# Patient Record
Sex: FEMALE | Race: WHITE | NOT HISPANIC OR LATINO | Employment: FULL TIME | URBAN - METROPOLITAN AREA
[De-identification: names, ages, dates, MRNs, and addresses within clinical notes are randomized per-mention and may not be internally consistent; named-entity substitution may affect disease eponyms.]

---

## 2018-05-05 ENCOUNTER — TELEPHONE (OUTPATIENT)
Dept: FAMILY MEDICINE | Facility: CLINIC | Age: 33
End: 2018-05-05

## 2018-05-05 RX ORDER — POLYMYXIN B SULFATE AND TRIMETHOPRIM 1; 10000 MG/ML; [USP'U]/ML
1 SOLUTION OPHTHALMIC
Qty: 10 ML | Refills: 0 | Status: SHIPPED | OUTPATIENT
Start: 2018-05-05 | End: 2019-03-11 | Stop reason: ALTCHOICE

## 2018-05-05 NOTE — TELEPHONE ENCOUNTER
"ON CALL\" pt started this am with L pink eye- discharge crusty and itchy. Does have sick baby at home. Not a contact lens wearer will call in polytrim eye drops for her to pharm and warm compresses and if not better will need OV  "

## 2018-08-13 RX ORDER — OMEPRAZOLE 20 MG/1
20 CAPSULE, DELAYED RELEASE ORAL
COMMUNITY
Start: 2015-05-26 | End: 2019-03-11 | Stop reason: ALTCHOICE

## 2018-08-14 ENCOUNTER — OFFICE VISIT (OUTPATIENT)
Dept: INTERNAL MEDICINE | Facility: CLINIC | Age: 33
End: 2018-08-14
Payer: COMMERCIAL

## 2018-08-14 VITALS
HEART RATE: 93 BPM | TEMPERATURE: 98.3 F | WEIGHT: 135 LBS | BODY MASS INDEX: 22.49 KG/M2 | OXYGEN SATURATION: 99 % | HEIGHT: 65 IN | RESPIRATION RATE: 16 BRPM | SYSTOLIC BLOOD PRESSURE: 116 MMHG | DIASTOLIC BLOOD PRESSURE: 70 MMHG

## 2018-08-14 DIAGNOSIS — K21.9 GASTROESOPHAGEAL REFLUX DISEASE, ESOPHAGITIS PRESENCE NOT SPECIFIED: ICD-10-CM

## 2018-08-14 DIAGNOSIS — Z00.00 ENCOUNTER FOR GENERAL ADULT MEDICAL EXAMINATION WITHOUT ABNORMAL FINDINGS: Primary | ICD-10-CM

## 2018-08-14 DIAGNOSIS — Z11.1 ENCOUNTER FOR PPD TEST: ICD-10-CM

## 2018-08-14 PROCEDURE — 99395 PREV VISIT EST AGE 18-39: CPT | Performed by: INTERNAL MEDICINE

## 2018-08-14 PROCEDURE — 86580 TB INTRADERMAL TEST: CPT | Performed by: INTERNAL MEDICINE

## 2018-08-14 ASSESSMENT — ENCOUNTER SYMPTOMS
COLOR CHANGE: 0
NEUROLOGICAL NEGATIVE: 1
MYALGIAS: 0
LIGHT-HEADEDNESS: 0
APPETITE CHANGE: 0
SLEEP DISTURBANCE: 0
CARDIOVASCULAR NEGATIVE: 1
TROUBLE SWALLOWING: 0
HEADACHES: 0
ENDOCRINE NEGATIVE: 1
EYE PAIN: 0
NECK PAIN: 0
VOICE CHANGE: 0
HEMATOLOGIC/LYMPHATIC NEGATIVE: 1
DIARRHEA: 0
WHEEZING: 0
BLOOD IN STOOL: 0
GASTROINTESTINAL NEGATIVE: 1
NAUSEA: 0
FATIGUE: 0
PALPITATIONS: 0
MUSCULOSKELETAL NEGATIVE: 1
CHEST TIGHTNESS: 0
NERVOUS/ANXIOUS: 0
DYSURIA: 0
EYE DISCHARGE: 0
WEAKNESS: 0
DECREASED CONCENTRATION: 0
ABDOMINAL PAIN: 0
DIZZINESS: 0
EYES NEGATIVE: 1
DYSPHORIC MOOD: 0
RESPIRATORY NEGATIVE: 1
NUMBNESS: 0
CHOKING: 0
PSYCHIATRIC NEGATIVE: 1
CONSTITUTIONAL NEGATIVE: 1
CONSTIPATION: 0
BACK PAIN: 0
ABDOMINAL DISTENTION: 0
ALLERGIC/IMMUNOLOGIC NEGATIVE: 1
ACTIVITY CHANGE: 0
UNEXPECTED WEIGHT CHANGE: 0
JOINT SWELLING: 0
SHORTNESS OF BREATH: 0
APNEA: 0
FREQUENCY: 0
CONFUSION: 0
COUGH: 0
FLANK PAIN: 0
HEMATURIA: 0
ARTHRALGIAS: 0
DIFFICULTY URINATING: 0

## 2018-08-14 NOTE — PROGRESS NOTES
Subjective     Patient ID: Nicole Zawada Care is a 32 y.o. female.    Doing well. No complaints. Diet stable. Exercise approx 2-3 x/week. Baby now almost one year old-went through IVF.  forms to be completed- returns to work already this week.              Review of Systems   Constitutional: Negative.  Negative for activity change, appetite change, fatigue and unexpected weight change.   HENT: Negative.  Negative for dental problem, ear pain, hearing loss, tinnitus, trouble swallowing and voice change.    Eyes: Negative.  Negative for pain, discharge and visual disturbance.   Respiratory: Negative.  Negative for apnea, cough, choking, chest tightness, shortness of breath and wheezing.    Cardiovascular: Negative.  Negative for chest pain, palpitations and leg swelling.   Gastrointestinal: Negative.  Negative for abdominal distention, abdominal pain, blood in stool, constipation, diarrhea and nausea.   Endocrine: Negative.  Negative for cold intolerance and heat intolerance.   Genitourinary: Negative.  Negative for difficulty urinating, dysuria, flank pain, frequency, hematuria, menstrual problem and urgency.   Musculoskeletal: Negative.  Negative for arthralgias, back pain, gait problem, joint swelling, myalgias and neck pain.   Skin: Negative.  Negative for color change, pallor and rash.   Allergic/Immunologic: Negative.  Negative for environmental allergies.   Neurological: Negative.  Negative for dizziness, weakness, light-headedness, numbness and headaches.   Hematological: Negative.    Psychiatric/Behavioral: Negative.  Negative for confusion, decreased concentration, dysphoric mood and sleep disturbance. The patient is not nervous/anxious.        Current Outpatient Prescriptions   Medication Sig Dispense Refill   • omeprazole (PriLOSEC) 20 mg capsule 20 mg.       No current facility-administered medications for this visit.      History reviewed. No pertinent past medical history.  History  "reviewed. No pertinent family history.  History reviewed. No pertinent surgical history.  Social History     Social History   • Marital status:      Spouse name: N/A   • Number of children: N/A   • Years of education: N/A     Occupational History   • Not on file.     Social History Main Topics   • Smoking status: Never Smoker   • Smokeless tobacco: Never Used   • Alcohol use Yes      Comment: social   • Drug use: No   • Sexual activity: Defer     Other Topics Concern   • Not on file     Social History Narrative   • No narrative on file     Allergies   Allergen Reactions   • No Known Allergies        Objective     Vitals:    08/14/18 1542   BP: 116/70   BP Location: Right upper arm   Patient Position: Sitting   Pulse: 93   Resp: 16   Temp: 36.8 °C (98.3 °F)   TempSrc: Oral   SpO2: 99%   Weight: 61.2 kg (135 lb)   Height: 1.638 m (5' 4.5\")     Body mass index is 22.81 kg/m².    Physical Exam   Constitutional: She is oriented to person, place, and time. She appears well-developed and well-nourished.   HENT:   Head: Normocephalic and atraumatic.   Right Ear: External ear normal.   Left Ear: External ear normal.   Nose: Nose normal.   Mouth/Throat: Oropharynx is clear and moist. No oropharyngeal exudate.   Eyes: Conjunctivae and EOM are normal. Pupils are equal, round, and reactive to light. Right eye exhibits no discharge. Left eye exhibits no discharge. No scleral icterus.   Neck: Normal range of motion. Neck supple. No JVD present. No tracheal deviation present. No thyromegaly present.   Cardiovascular: Normal rate, regular rhythm, normal heart sounds and intact distal pulses.  Exam reveals no gallop and no friction rub.    No murmur heard.  Pulmonary/Chest: Effort normal and breath sounds normal. No stridor. No respiratory distress. She has no wheezes. She has no rales. She exhibits no tenderness.   Abdominal: Soft. Bowel sounds are normal. She exhibits no distension and no mass. There is no tenderness. There " is no rebound and no guarding. No hernia.   Musculoskeletal: Normal range of motion. She exhibits no edema, tenderness or deformity.   Lymphadenopathy:     She has no cervical adenopathy.   Neurological: She is alert and oriented to person, place, and time. She displays normal reflexes. No cranial nerve deficit or sensory deficit. She exhibits normal muscle tone. Coordination normal.   Skin: Skin is warm. Capillary refill takes less than 2 seconds. No rash noted. No erythema. No pallor.   Psychiatric: She has a normal mood and affect. Her behavior is normal. Judgment and thought content normal.   Nursing note and vitals reviewed.      Assessment/Plan   Problem List Items Addressed This Visit        Other    Encounter for PPD test     Place today rto in 48 hrs to be read         Relevant Orders    POCT TB Skin Test    Encounter for general adult medical examination without abnormal findings - Primary     utd HM  vax UTD  Labs ordered.   Annual exam rec.   Gyn annually             Relevant Orders    CBC and differential    Comprehensive metabolic panel    Hemoglobin A1c    Lipid panel    Urinalysis with Reflex Culture    TSH w reflex FT4      Other Visit Diagnoses     Gastroesophageal reflux disease, esophagitis presence not specified        Relevant Medications    omeprazole (PriLOSEC) 20 mg capsule        Orders Placed This Encounter   Procedures   • CBC and differential     Standing Status:   Future     Number of Occurrences:   1     Standing Expiration Date:   8/14/2019   • Comprehensive metabolic panel     Standing Status:   Future     Number of Occurrences:   1     Standing Expiration Date:   8/14/2019   • Hemoglobin A1c     Standing Status:   Future     Number of Occurrences:   1     Standing Expiration Date:   8/14/2019   • Lipid panel     Standing Status:   Future     Number of Occurrences:   1     Standing Expiration Date:   8/14/2019   • Urinalysis with Reflex Culture     Standing Status:   Future      Number of Occurrences:   1     Standing Expiration Date:   8/14/2019   • TSH w reflex FT4     Standing Status:   Future     Number of Occurrences:   1     Standing Expiration Date:   8/14/2019   • POCT TB Skin Test

## 2018-08-16 LAB
INDURATION: 0 MM
TB SKIN TEST: NEGATIVE

## 2019-03-11 ENCOUNTER — OFFICE VISIT (OUTPATIENT)
Dept: INTERNAL MEDICINE | Facility: CLINIC | Age: 34
End: 2019-03-11
Payer: COMMERCIAL

## 2019-03-11 VITALS
WEIGHT: 134.6 LBS | OXYGEN SATURATION: 98 % | HEART RATE: 70 BPM | HEIGHT: 65 IN | DIASTOLIC BLOOD PRESSURE: 72 MMHG | SYSTOLIC BLOOD PRESSURE: 116 MMHG | RESPIRATION RATE: 14 BRPM | TEMPERATURE: 98.2 F | BODY MASS INDEX: 22.42 KG/M2

## 2019-03-11 DIAGNOSIS — J02.9 ACUTE PHARYNGITIS, UNSPECIFIED ETIOLOGY: Primary | ICD-10-CM

## 2019-03-11 LAB — S PYO AG THROAT QL: NEGATIVE

## 2019-03-11 PROCEDURE — 87880 STREP A ASSAY W/OPTIC: CPT | Performed by: NURSE PRACTITIONER

## 2019-03-11 PROCEDURE — 99213 OFFICE O/P EST LOW 20 MIN: CPT | Performed by: NURSE PRACTITIONER

## 2019-03-11 ASSESSMENT — ENCOUNTER SYMPTOMS
WHEEZING: 0
DIZZINESS: 0
DIARRHEA: 0
ABDOMINAL PAIN: 0
SHORTNESS OF BREATH: 0
COUGH: 1
FATIGUE: 1
EYE ITCHING: 0
EYE DISCHARGE: 0
SORE THROAT: 1
RHINORRHEA: 1

## 2019-03-11 NOTE — ASSESSMENT & PLAN NOTE
Rapid strep negative.  Advised continue with saline gargles and increase fluids.  Okay to take Tylenol or Advil as directed for pain and fever reducer.  Advised no better or worse in 2-3 days, they are to let us know.  Otherwise return as needed/scheduled.

## 2019-03-11 NOTE — PROGRESS NOTES
Daily Progress Note      Subjective      Patient ID: Nicole Zawada Care is a 33 y.o. female presents   Chief Complaint   Patient presents with   • Sore Throat     was treated last month for strep throat   .    Sore Throat    This is a recurrent problem. The current episode started 1 to 4 weeks ago. The problem has been waxing and waning. There has been no fever. The pain is at a severity of 4/10. The pain is mild. Associated symptoms include congestion, coughing and ear pain. Pertinent negatives include no abdominal pain, diarrhea, ear discharge or shortness of breath. She has had exposure to strep. She has had no exposure to mono. She has tried nothing for the symptoms. The treatment provided mild relief.   Seen in Urgent a few weeks and tested pos for strep. Treated with Amoxil. And changed tooth brush, but now again with sore throat    The following have been reviewed and updated as appropriate in this visit:  Tobacco  Allergies  Meds  Problems  Med Hx  Surg Hx  Fam Hx  Soc Hx        Review of Systems   Constitutional: Positive for fatigue.   HENT: Positive for congestion, ear pain, postnasal drip, rhinorrhea and sore throat. Negative for ear discharge.    Eyes: Negative for discharge and itching.   Respiratory: Positive for cough. Negative for shortness of breath and wheezing.    Cardiovascular: Negative for chest pain.   Gastrointestinal: Negative for abdominal pain and diarrhea.   Skin: Negative for rash.   Neurological: Negative for dizziness.           History reviewed. No pertinent past medical history.  History reviewed. No pertinent surgical history.  Social History     Social History   • Marital status:      Spouse name: N/A   • Number of children: N/A   • Years of education: N/A     Occupational History   • Not on file.     Social History Main Topics   • Smoking status: Never Smoker   • Smokeless tobacco: Never Used   • Alcohol use Yes      Comment: social   • Drug use: No   • Sexual  "activity: Defer     Other Topics Concern   • Not on file     Social History Narrative    Teacher     Allergies   Allergen Reactions   • No Known Allergies      Current Outpatient Prescriptions   Medication Sig Dispense Refill   • ranitidine (ZANTAC) 150 mg tablet Take 150 mg by mouth daily.       No current facility-administered medications for this visit.          Objective     /72 (BP Location: Left upper arm, Patient Position: Sitting)   Pulse 70   Temp 36.8 °C (98.2 °F) (Oral)   Resp 14   Ht 1.638 m (5' 4.5\")   Wt 61.1 kg (134 lb 9.6 oz)   LMP 02/13/2019   SpO2 98%   BMI 22.75 kg/m²       Physical Exam   Constitutional: She is oriented to person, place, and time. She appears well-developed and well-nourished.   HENT:   Head: Normocephalic. Head is without right periorbital erythema and without left periorbital erythema.   Right Ear: Tympanic membrane is not injected.   Left Ear: Tympanic membrane is not injected.   Nose: Right sinus exhibits no maxillary sinus tenderness and no frontal sinus tenderness. Left sinus exhibits no maxillary sinus tenderness and no frontal sinus tenderness.   Mouth/Throat: Posterior oropharyngeal erythema present. No oropharyngeal exudate or posterior oropharyngeal edema.   Eyes: Conjunctivae are normal. Right eye exhibits no discharge. Left eye exhibits no discharge.   Neck: Normal range of motion.   Cardiovascular: Normal rate and regular rhythm.    Pulmonary/Chest: Effort normal and breath sounds normal.   Abdominal: Soft. Bowel sounds are normal.   Lymphadenopathy:     She has no cervical adenopathy.   Neurological: She is alert and oriented to person, place, and time.   Skin: Skin is warm and dry. Capillary refill takes less than 2 seconds.   Psychiatric: She has a normal mood and affect.       Assessment/Plan   Problem List Items Addressed This Visit        Other    Acute pharyngitis - Primary     Rapid strep negative.  Advised continue with saline gargles and " increase fluids.  Okay to take Tylenol or Advil as directed for pain and fever reducer.  Advised no better or worse in 2-3 days, they are to let us know.  Otherwise return as needed/scheduled.         Relevant Orders    POCT rapid strep A              KINGSLEY Mcbride  3/11/2019

## 2020-03-26 ENCOUNTER — TELEPHONE (OUTPATIENT)
Dept: PRIMARY CARE | Facility: CLINIC | Age: 35
End: 2020-03-26

## 2020-06-22 ENCOUNTER — OFFICE VISIT (OUTPATIENT)
Dept: PRIMARY CARE | Facility: CLINIC | Age: 35
End: 2020-06-22
Payer: COMMERCIAL

## 2020-06-22 VITALS
SYSTOLIC BLOOD PRESSURE: 120 MMHG | HEART RATE: 83 BPM | TEMPERATURE: 98.4 F | HEIGHT: 64 IN | BODY MASS INDEX: 22.98 KG/M2 | RESPIRATION RATE: 14 BRPM | WEIGHT: 134.6 LBS | DIASTOLIC BLOOD PRESSURE: 74 MMHG | OXYGEN SATURATION: 99 %

## 2020-06-22 DIAGNOSIS — Z00.00 ROUTINE MEDICAL EXAM: Primary | ICD-10-CM

## 2020-06-22 DIAGNOSIS — K21.9 GASTROESOPHAGEAL REFLUX DISEASE WITHOUT ESOPHAGITIS: ICD-10-CM

## 2020-06-22 PROBLEM — Z11.1 ENCOUNTER FOR PPD TEST: Status: RESOLVED | Noted: 2018-08-14 | Resolved: 2020-06-22

## 2020-06-22 PROBLEM — J02.9 ACUTE PHARYNGITIS: Status: RESOLVED | Noted: 2019-03-11 | Resolved: 2020-06-22

## 2020-06-22 PROCEDURE — 99395 PREV VISIT EST AGE 18-39: CPT | Performed by: NURSE PRACTITIONER

## 2020-06-22 RX ORDER — FAMOTIDINE 40 MG/1
40 TABLET, FILM COATED ORAL DAILY
COMMUNITY
End: 2021-07-02 | Stop reason: ALTCHOICE

## 2020-06-22 ASSESSMENT — ENCOUNTER SYMPTOMS
DIZZINESS: 0
PALPITATIONS: 0
NECK PAIN: 0
VOMITING: 0
NECK STIFFNESS: 0
BACK PAIN: 0
SHORTNESS OF BREATH: 0
LIGHT-HEADEDNESS: 0
DIARRHEA: 0
FEVER: 0
ADENOPATHY: 0
COUGH: 0
BRUISES/BLEEDS EASILY: 0
ARTHRALGIAS: 0
CHILLS: 0
ABDOMINAL PAIN: 0
HEADACHES: 0
DYSURIA: 0
NAUSEA: 0
FATIGUE: 0
TROUBLE SWALLOWING: 0

## 2020-06-22 NOTE — PATIENT INSTRUCTIONS
Continue with healthy diet and exercise.    Up  To date with dental and eye exams.  Up to date with gyn.   Up to date with immunizations: Flu, Tdap.   Complete routine screening labs.  Follow up in 1 year.

## 2020-06-22 NOTE — PROGRESS NOTES
Subjective      Patient ID: Nicole Zawada Care is a 34 y.o. female.  1985      Patient presents for a physical exam.   Diet overall is well-balanced and is starting to get back on exercise routine.   with 1 daughter age 3 (Tesha).  Menses is regular, sees [facility]. Dental and eye exam up to date. Sleeps well overall.  She is a  at [employer].   Offers no complaints .    GERD: sees [facility] Dr. Khan, she has had EGD in the past       The following have been reviewed and updated as appropriate in this visit:  Tobacco  Allergies  Meds  Med Hx  Surg Hx  Fam Hx  Soc Hx      Review of Systems   Constitutional: Negative for chills, fatigue and fever.   HENT: Negative for trouble swallowing.    Respiratory: Negative for cough and shortness of breath.    Cardiovascular: Negative for chest pain, palpitations and leg swelling.   Gastrointestinal: Negative for abdominal pain, diarrhea, nausea and vomiting.   Genitourinary: Negative for dysuria and urgency.   Musculoskeletal: Negative for arthralgias, back pain, neck pain and neck stiffness.   Skin: Negative for rash.        Sees Dr. Ember moreau    Neurological: Negative for dizziness, light-headedness and headaches.   Hematological: Negative for adenopathy. Does not bruise/bleed easily.     Patient Active Problem List   Diagnosis   • Gastroesophageal reflux disease without esophagitis      History reviewed. No pertinent past medical history.  History reviewed. No pertinent surgical history.  Social History     Socioeconomic History   • Marital status:      Spouse name: None   • Number of children: None   • Years of education: None   • Highest education level: None   Occupational History   • None   Social Needs   • Financial resource strain: None   • Food insecurity:     Worry: None     Inability: None   • Transportation needs:     Medical: None     Non-medical: None   Tobacco Use   • Smoking status: Never Smoker   • Smokeless tobacco:  "Never Used   Substance and Sexual Activity   • Alcohol use: Yes     Comment: social   • Drug use: No   • Sexual activity: Yes     Partners: Male     Birth control/protection: None   Lifestyle   • Physical activity:     Days per week: None     Minutes per session: None   • Stress: None   Relationships   • Social connections:     Talks on phone: None     Gets together: None     Attends Taoist service: None     Active member of club or organization: None     Attends meetings of clubs or organizations: None     Relationship status: None   • Intimate partner violence:     Fear of current or ex partner: None     Emotionally abused: None     Physically abused: None     Forced sexual activity: None   Other Topics Concern   • None   Social History Narrative    Teacher     Objective     Vitals:    06/22/20 0800 06/22/20 0819   BP: 124/88 120/74   BP Location: Left upper arm    Patient Position: Sitting    Pulse: 83    Resp: 14    Temp: 36.9 °C (98.4 °F)    TempSrc: Oral    SpO2: 99%    Weight: 61.1 kg (134 lb 9.6 oz)    Height: 1.626 m (5' 4\")      Body mass index is 23.1 kg/m².  Current Outpatient Medications   Medication Sig Dispense Refill   • famotidine (PEPCID) 40 mg tablet Take 40 mg by mouth daily.       No current facility-administered medications for this visit.        Physical Exam   Constitutional: She is oriented to person, place, and time. She appears well-developed and well-nourished.   HENT:   Head: Normocephalic and atraumatic.   Right Ear: External ear normal.   Left Ear: External ear normal.   Nose: Nose normal.   Mouth/Throat: Oropharynx is clear and moist.   Eyes: Conjunctivae and EOM are normal.   Neck: Normal range of motion. Neck supple. No thyromegaly present.   Cardiovascular: Normal rate, regular rhythm, normal heart sounds and intact distal pulses.   Pulmonary/Chest: Effort normal and breath sounds normal.   Abdominal: Soft. Bowel sounds are normal. There is no tenderness.   Musculoskeletal: " Normal range of motion.   Lymphadenopathy:     She has no cervical adenopathy.   Neurological: She is alert and oriented to person, place, and time.   Skin: Skin is warm and dry.   Psychiatric: She has a normal mood and affect. Her behavior is normal. Judgment and thought content normal.       Assessment/Plan     Problem List Items Addressed This Visit        Digestive    Gastroesophageal reflux disease without esophagitis    Current Assessment & Plan     Stable on pepcid, managed by dr. Khan of [facility].         Relevant Medications    famotidine (PEPCID) 40 mg tablet      Other Visit Diagnoses     Routine medical exam    -  Primary    [place] labs ordered. Up to date with dental, eye, pap and immunizations.  Continue to work on healthy diet and exercise.    Relevant Orders    Lipid panel    Comprehensive metabolic panel    CBC    TSH w reflex FT4

## 2020-06-23 ENCOUNTER — TELEPHONE (OUTPATIENT)
Dept: PRIMARY CARE | Facility: CLINIC | Age: 35
End: 2020-06-23

## 2020-06-23 NOTE — TELEPHONE ENCOUNTER
----- Message from KINGSLEY Sapp sent at 6/22/2020  8:45 AM EDT -----  Please request consult form from [facility]. Bill PATINO and KRYSTAL

## 2020-06-25 ENCOUNTER — TELEPHONE (OUTPATIENT)
Dept: PRIMARY CARE | Facility: CLINIC | Age: 35
End: 2020-06-25

## 2020-06-25 LAB
ALBUMIN SERPL-MCNC: 4.8 G/DL (ref 3.8–4.8)
ALBUMIN/GLOB SERPL: 2.1 {RATIO} (ref 1.2–2.2)
ALP SERPL-CCNC: 47 IU/L (ref 39–117)
ALT SERPL-CCNC: 14 IU/L (ref 0–32)
AST SERPL-CCNC: 22 IU/L (ref 0–40)
BILIRUB SERPL-MCNC: 0.4 MG/DL (ref 0–1.2)
BUN SERPL-MCNC: 13 MG/DL (ref 6–20)
BUN/CREAT SERPL: 18 (ref 9–23)
CALCIUM SERPL-MCNC: 9.7 MG/DL (ref 8.7–10.2)
CHLORIDE SERPL-SCNC: 98 MMOL/L (ref 96–106)
CHOLEST SERPL-MCNC: 248 MG/DL (ref 100–199)
CO2 SERPL-SCNC: 24 MMOL/L (ref 20–29)
CREAT SERPL-MCNC: 0.73 MG/DL (ref 0.57–1)
ERYTHROCYTE [DISTWIDTH] IN BLOOD BY AUTOMATED COUNT: 11.8 % (ref 11.7–15.4)
GLOBULIN SER CALC-MCNC: 2.3 G/DL (ref 1.5–4.5)
GLUCOSE SERPL-MCNC: 74 MG/DL (ref 65–99)
HCT VFR BLD AUTO: 40 % (ref 34–46.6)
HDLC SERPL-MCNC: 84 MG/DL
HGB BLD-MCNC: 13.4 G/DL (ref 11.1–15.9)
LAB CORP EGFR IF AFRICN AM: 125 ML/MIN/1.73
LAB CORP EGFR IF NONAFRICN AM: 109 ML/MIN/1.73
LDLC SERPL CALC-MCNC: 148 MG/DL (ref 0–99)
MCH RBC QN AUTO: 30.4 PG (ref 26.6–33)
MCHC RBC AUTO-ENTMCNC: 33.5 G/DL (ref 31.5–35.7)
MCV RBC AUTO: 91 FL (ref 79–97)
PLATELET # BLD AUTO: 297 X10E3/UL (ref 150–450)
POTASSIUM SERPL-SCNC: 4.2 MMOL/L (ref 3.5–5.2)
PROT SERPL-MCNC: 7.1 G/DL (ref 6–8.5)
RBC # BLD AUTO: 4.41 X10E6/UL (ref 3.77–5.28)
SODIUM SERPL-SCNC: 139 MMOL/L (ref 134–144)
T4 FREE SERPL-MCNC: 1.44 NG/DL (ref 0.82–1.77)
TRIGL SERPL-MCNC: 81 MG/DL (ref 0–149)
TSH SERPL DL<=0.005 MIU/L-ACNC: 1.34 UIU/ML (ref 0.45–4.5)
VLDLC SERPL CALC-MCNC: 16 MG/DL (ref 5–40)
WBC # BLD AUTO: 5.3 X10E3/UL (ref 3.4–10.8)

## 2020-06-25 NOTE — TELEPHONE ENCOUNTER
----- Message from KINGSLEY Sapp sent at 6/22/2020  8:49 AM EDT -----  Please request pap from [facility]

## 2021-03-24 ENCOUNTER — TELEPHONE (OUTPATIENT)
Dept: PRIMARY CARE | Facility: CLINIC | Age: 36
End: 2021-03-24

## 2021-03-24 NOTE — TELEPHONE ENCOUNTER
Patient needs to R/S her EPP that she has in June because she will be out of town. Next opening is August for Dr Cortes  She is wanting to start with Fertility  In August and wanted  To get her EPP prior to.  would like to see If Dr MOON could see her sometime in July

## 2021-03-25 NOTE — TELEPHONE ENCOUNTER
LVM offering PE with our NPs, asked patient to call office back if she would like to reschedule her appointment.

## 2021-03-26 DIAGNOSIS — Z23 ENCOUNTER FOR IMMUNIZATION: ICD-10-CM

## 2021-07-02 ENCOUNTER — OFFICE VISIT (OUTPATIENT)
Dept: PRIMARY CARE | Facility: CLINIC | Age: 36
End: 2021-07-02
Payer: COMMERCIAL

## 2021-07-02 VITALS
DIASTOLIC BLOOD PRESSURE: 82 MMHG | HEIGHT: 64 IN | WEIGHT: 133.8 LBS | RESPIRATION RATE: 16 BRPM | SYSTOLIC BLOOD PRESSURE: 126 MMHG | BODY MASS INDEX: 22.84 KG/M2 | HEART RATE: 105 BPM | TEMPERATURE: 97.6 F | OXYGEN SATURATION: 98 %

## 2021-07-02 DIAGNOSIS — F41.9 ANXIETY: Primary | ICD-10-CM

## 2021-07-02 DIAGNOSIS — F39 MOOD DISORDER (CMS/HCC): ICD-10-CM

## 2021-07-02 PROCEDURE — 99213 OFFICE O/P EST LOW 20 MIN: CPT | Performed by: NURSE PRACTITIONER

## 2021-07-02 PROCEDURE — 3008F BODY MASS INDEX DOCD: CPT | Performed by: NURSE PRACTITIONER

## 2021-07-02 RX ORDER — OMEPRAZOLE 40 MG/1
CAPSULE, DELAYED RELEASE ORAL
Status: ON HOLD | COMMUNITY
Start: 2021-05-17 | End: 2023-12-30

## 2021-07-02 RX ORDER — HYDROXYZINE PAMOATE 50 MG/1
50 CAPSULE ORAL 3 TIMES DAILY PRN
Qty: 30 CAPSULE | Refills: 0 | Status: SHIPPED | OUTPATIENT
Start: 2021-07-02 | End: 2022-10-27 | Stop reason: ALTCHOICE

## 2021-07-02 RX ORDER — ESTRADIOL VALERATE 20 MG/ML
INJECTION INTRAMUSCULAR
COMMUNITY
Start: 2021-06-17 | End: 2021-07-14 | Stop reason: ALTCHOICE

## 2021-07-02 RX ORDER — PROGESTERONE 50 MG/ML
INJECTION, SOLUTION INTRAMUSCULAR
COMMUNITY
Start: 2021-06-17 | End: 2021-07-14 | Stop reason: ALTCHOICE

## 2021-07-02 RX ORDER — ISOPROPYL ALCOHOL 70 ML/100ML
SWAB TOPICAL
COMMUNITY
Start: 2021-06-17 | End: 2021-07-14 | Stop reason: ALTCHOICE

## 2021-07-02 RX ORDER — NEEDLES, DISPOSABLE 25GX5/8"
NEEDLE, DISPOSABLE MISCELLANEOUS
COMMUNITY
Start: 2021-06-17 | End: 2021-07-14 | Stop reason: ALTCHOICE

## 2021-07-02 RX ORDER — SYRINGE, DISPOSABLE, 1 ML
SYRINGE, EMPTY DISPOSABLE MISCELLANEOUS
COMMUNITY
Start: 2021-06-17 | End: 2021-07-14 | Stop reason: ALTCHOICE

## 2021-07-02 RX ORDER — SERTRALINE HYDROCHLORIDE 25 MG/1
25 TABLET, FILM COATED ORAL DAILY
Qty: 30 TABLET | Refills: 0 | Status: SHIPPED | OUTPATIENT
Start: 2021-07-02 | End: 2021-07-26 | Stop reason: SDUPTHER

## 2021-07-02 ASSESSMENT — ENCOUNTER SYMPTOMS
SLEEP DISTURBANCE: 0
PALPITATIONS: 0
INSOMNIA: 0
CHEST TIGHTNESS: 1
NERVOUS/ANXIOUS: 1

## 2021-07-02 NOTE — PATIENT INSTRUCTIONS
Patient presents with increased anxiety. Will start on Zoloft 25 mg and have patient check in in 3-4 weeks. Prescribed Hydroxyzine to use as needed. Referral placed for behavioral therapy.

## 2021-07-02 NOTE — PROGRESS NOTES
"      Subjective      Patient ID: Nicole Zawada Care is a 35 y.o. female.  1985      States has always had anxiety, never been prescribed any meds in the past. States  may have cancer, so has been having crying episodes.Currently feeling some depression but usually anxiety. States anxiety is daily. States is focusing a lot on things. Has not had any panic attacks. Feels chest tightness at time of high anxiety, that resolves. Sleeping ok. Open to medication and talking to someone    Anxiety  Presents for initial visit. Onset was in the past 7 days. The problem has been gradually worsening. Symptoms include excessive worry and nervous/anxious behavior. Patient reports no chest pain, insomnia, palpitations or suicidal ideas. The symptoms are aggravated by family issues.     Risk factors include a major life event. Past treatments include nothing.       The following have been reviewed and updated as appropriate in this visit:  Tobacco  Allergies  Meds  Problems  Med Hx  Surg Hx  Fam Hx       Review of Systems   Respiratory: Positive for chest tightness.    Cardiovascular: Negative for chest pain and palpitations.   Psychiatric/Behavioral: Negative for sleep disturbance and suicidal ideas. The patient is nervous/anxious. The patient does not have insomnia.        Objective     Vitals:    07/02/21 1438   BP: 126/82   BP Location: Left upper arm   Patient Position: Sitting   Pulse: (!) 105   Resp: 16   Temp: 36.4 °C (97.6 °F)   TempSrc: Temporal   SpO2: 98%   Weight: 60.7 kg (133 lb 12.8 oz)   Height: 1.626 m (5' 4\")     Body mass index is 22.97 kg/m².    Physical Exam  Vitals and nursing note reviewed.   Cardiovascular:      Rate and Rhythm: Normal rate and regular rhythm.      Heart sounds: Normal heart sounds, S1 normal and S2 normal.   Pulmonary:      Effort: Pulmonary effort is normal.      Breath sounds: Normal breath sounds. No decreased breath sounds, wheezing, rhonchi or rales.   Neurological: "      Mental Status: She is alert.         Assessment/Plan   Diagnoses and all orders for this visit:    Anxiety (Primary)  -     hydrOXYzine (VistariL) 50 mg capsule; Take 1 capsule (50 mg total) by mouth 3 (three) times a day as needed for anxiety.  -     Ambulatory referral to Behavioral Health; Future    Mood disorder (CMS/[facility])  -     hydrOXYzine (VistariL) 50 mg capsule; Take 1 capsule (50 mg total) by mouth 3 (three) times a day as needed for anxiety.  -     sertraline (ZOLOFT) 25 mg tablet; Take 1 tablet (25 mg total) by mouth daily.  -     Ambulatory referral to Behavioral Health; Future      Patient Instructions   Patient presents with increased anxiety. Will start on Zoloft 25 mg and have patient check in in 3-4 weeks. Prescribed Hydroxyzine to use as needed. Referral placed for behavioral therapy[alexandre

## 2021-07-14 ENCOUNTER — OFFICE VISIT (OUTPATIENT)
Dept: PRIMARY CARE | Facility: CLINIC | Age: 36
End: 2021-07-14
Payer: COMMERCIAL

## 2021-07-14 VITALS
BODY MASS INDEX: 22.13 KG/M2 | HEIGHT: 64 IN | SYSTOLIC BLOOD PRESSURE: 110 MMHG | OXYGEN SATURATION: 99 % | WEIGHT: 129.6 LBS | HEART RATE: 86 BPM | RESPIRATION RATE: 16 BRPM | DIASTOLIC BLOOD PRESSURE: 88 MMHG | TEMPERATURE: 97.4 F

## 2021-07-14 DIAGNOSIS — F39 MOOD DISORDER (CMS/HCC): ICD-10-CM

## 2021-07-14 DIAGNOSIS — Z00.00 ENCOUNTER FOR GENERAL ADULT MEDICAL EXAMINATION WITHOUT ABNORMAL FINDINGS: Primary | ICD-10-CM

## 2021-07-14 PROCEDURE — 99395 PREV VISIT EST AGE 18-39: CPT | Performed by: NURSE PRACTITIONER

## 2021-07-14 PROCEDURE — 3008F BODY MASS INDEX DOCD: CPT | Performed by: NURSE PRACTITIONER

## 2021-07-14 ASSESSMENT — ENCOUNTER SYMPTOMS
DECREASED CONCENTRATION: 0
SHORTNESS OF BREATH: 0
COUGH: 0
NERVOUS/ANXIOUS: 0
FATIGUE: 0
SLEEP DISTURBANCE: 0
FEVER: 0

## 2021-07-14 ASSESSMENT — PATIENT HEALTH QUESTIONNAIRE - PHQ9
SUM OF ALL RESPONSES TO PHQ QUESTIONS 1-9: 3
SUM OF ALL RESPONSES TO PHQ9 QUESTIONS 1 & 2: 1

## 2021-07-14 NOTE — PROGRESS NOTES
"      Subjective      Patient ID: Nicole Zawada Care is a 35 y.o. female.  1985      Annual exam; denies any issues  States doing well with current meds, will send message in 3-4 weeks about how dosage of Zoloft is working   currently diagnosed with lymphoma, starts chemotherapy on Tues. Feels better that there is a plan in place. Has not used hydroxizine yet, still has on hand just in case.      The following have been reviewed and updated as appropriate in this visit:  Tobacco  Allergies  Meds  Problems  Med Hx  Surg Hx  Fam Hx       Review of Systems   Constitutional: Negative for fatigue and fever.   Respiratory: Negative for cough and shortness of breath.    Cardiovascular: Negative for chest pain.   Psychiatric/Behavioral: Negative for behavioral problems, decreased concentration, self-injury, sleep disturbance and suicidal ideas. The patient is not nervous/anxious.        Objective     Vitals:    07/14/21 0802   BP: 110/88   BP Location: Left upper arm   Patient Position: Sitting   Pulse: 86   Resp: 16   Temp: 36.3 °C (97.4 °F)   TempSrc: Oral   SpO2: 99%   Weight: 58.8 kg (129 lb 9.6 oz)   Height: 1.626 m (5' 4\")     Body mass index is 22.25 kg/m².    Physical Exam  Vitals and nursing note reviewed.   Constitutional:       General: She is not in acute distress.     Appearance: Normal appearance. She is well-developed. She is not diaphoretic.   HENT:      Right Ear: Hearing, tympanic membrane, ear canal and external ear normal.      Left Ear: Hearing, tympanic membrane, ear canal and external ear normal.      Nose: Nose normal.      Right Sinus: No maxillary sinus tenderness or frontal sinus tenderness.      Left Sinus: No maxillary sinus tenderness or frontal sinus tenderness.      Mouth/Throat:      Lips: Pink.      Mouth: Mucous membranes are moist.      Pharynx: Oropharynx is clear. Uvula midline.      Tonsils: No tonsillar exudate. 0 on the right. 0 on the left.   Eyes:      General: " Lids are normal.      Extraocular Movements: Extraocular movements intact.      Conjunctiva/sclera: Conjunctivae normal.      Pupils: Pupils are equal, round, and reactive to light.   Neck:      Thyroid: No thyroid mass or thyromegaly.      Trachea: Trachea normal.   Cardiovascular:      Rate and Rhythm: Normal rate and regular rhythm.      Heart sounds: Normal heart sounds, S1 normal and S2 normal. No murmur heard.   No friction rub. No gallop.    Pulmonary:      Effort: Pulmonary effort is normal. No respiratory distress.      Breath sounds: Normal breath sounds. No stridor. No decreased breath sounds, wheezing, rhonchi or rales.   Abdominal:      General: Bowel sounds are normal.      Palpations: Abdomen is soft.      Tenderness: There is no abdominal tenderness.   Musculoskeletal:      Cervical back: Normal range of motion.      Comments: ROM +4 for all extremities    Lymphadenopathy:      Head:      Right side of head: No submental, submandibular, tonsillar, preauricular or posterior auricular adenopathy.      Left side of head: No submental, submandibular, tonsillar, preauricular or posterior auricular adenopathy.      Cervical: No cervical adenopathy.   Skin:     General: Skin is warm and dry.   Neurological:      Mental Status: She is alert and oriented to person, place, and time.      GCS: GCS eye subscore is 4. GCS verbal subscore is 5. GCS motor subscore is 6.      Motor: Motor function is intact.      Coordination: Coordination is intact.      Gait: Gait is intact.   Psychiatric:         Attention and Perception: Attention normal.         Mood and Affect: Mood normal.         Speech: Speech normal.         Behavior: Behavior normal.         Thought Content: Thought content normal.         Cognition and Memory: Cognition normal.         Judgment: Judgment normal.         Assessment/Plan   Diagnoses and all orders for this visit:    Encounter for general adult medical examination without abnormal findings  (Primary)  -     CBC and differential; Future  -     Comprehensive metabolic panel; Future  -     Hepatic function panel; Future  -     Lipid panel; Future  -     TSH w reflex FT4; Future  -     Urinalysis with Reflex Culture; Future    Mood disorder (CMS/HCC)      Patient Instructions   Patient presents for physical for annual visit.  Health screening completed and physical exam done.  Discussed results with patient.  Exam performed in accordance with screening guidelines.  Labs ordered, will call when resuls available.

## 2021-07-14 NOTE — PATIENT INSTRUCTIONS
Patient presents for physical for annual visit.  Health screening completed and physical exam done.  Discussed results with patient.  Exam performed in accordance with screening guidelines.  Labs ordered, will call when resuls available.

## 2021-07-26 ENCOUNTER — OFFICE VISIT (OUTPATIENT)
Dept: PRIMARY CARE | Facility: CLINIC | Age: 36
End: 2021-07-26
Payer: COMMERCIAL

## 2021-07-26 DIAGNOSIS — F39 MOOD DISORDER (CMS/HCC): ICD-10-CM

## 2021-07-26 DIAGNOSIS — F43.22 ADJUSTMENT DISORDER WITH ANXIOUS MOOD: Primary | ICD-10-CM

## 2021-07-26 ASSESSMENT — COGNITIVE AND FUNCTIONAL STATUS - GENERAL
LIBIDO: NO CHANGE
APPETITE: NO CHANGE
DELUSIONS: NONE OR AGE APPROPRIATE
RECENT MEMORY: WNL
APPEARANCE: WELL GROOMED
SLEEP_WAKE_CYCLE: DECREASED
CONCENTRATION: WNL
EYE_CONTACT: WNL
THOUGHT_CONTENT: APPROPRIATE
AFFECT: FULL RANGE
ORIENTATION: FULLY ORIENTED
PERCEPTUAL FUNCTION: NORMAL
MOOD: EUTHYMIC (NORMAL)
REMOTE MEMORY: WNL
PSYCHOMOTOR FUNCTIONING: WNL
AROUSAL LEVEL: ALERT
SPEECH: REGULAR
INSIGHT: INTACT
IMPULSE CONTROL: INTACT
THOUGHT_PROCESS: WNL
ATTENTION: WNL

## 2021-07-26 NOTE — PATIENT INSTRUCTIONS
Begin to monitor anxious thoughts, feelings related to your husbands diagnosis and rate them on a scale of 1-100 (100=most anxious). Consider going back and re-rating them later once anxiety has passed.   Practice progressive muscle relaxation skills at night before bed.

## 2021-07-26 NOTE — PROGRESS NOTES
Integrated Behavioral Health Outpatient Initial Visit- In office  Visit Type Performed: In-person    Nicole Zawada Care, a 35 y.o. female, presented today for an initial behavioral health evaluation visit.  Clinician confirmed identification of patient by name and birth date.      Informed Consent/Confidentiality:  Pt was explained the model of primary care behavioral health we provide at [facility], including the following:  The doctoral psychology intern/post-doctoral psychology fellow is under the direct supervision of a licensed psychologist (Caroline Camacho Psy.D., and Ashlee Pichardo Psy.D.).   The psychology intern/fellow collaborates regularly with the pt’s PCP regarding the pt’s treatment. The integrated behavioral health progress notes are visible to the physicians and advanced practitioners in the practice where the pt is being seen. The visit diagnosis and appointment/scheduling information is visible to the patient's EPIC chart, to provide continuity of care across the [facility] system. The pt will also have access to view their notes via [facility] (pt portal).  This is a low-intensity model of care (we provide 8-10 visits of cognitive-behavioral therapy), and the patient has the right to other options of behavioral health care that are indicated for more severe conditions (ie: Traditional Outpatient psychotherapy, Intensive Outpatient Programs, Partial Hospitalization Programs, and Inpatient services).  The intern/fellow is providing this care by participating in a training program, and therefore they can not be involved with any legal issues/forms (including disability, FMLA, etc.).    Also discussed were confidentiality and the limits of confidentiality (ie: if pt is at imminent risk of suicide, homicide, or reports child abuse/neglect, providers may need to break confidentiality to ensure the safety of the pt or to follow mandated reporting requirements).   Pt expressed understanding and consent:  Yes      SUBJECTIVE     Reason for visit:  She presented today regarding difficulty adjusting to her 's recent cancer diagnosis.    History of Behavioral Health Treatment  Previous treatment: None.   Previously experienced symptoms of: Anxiety  Other pertinent behavioral health history: Experienced symptoms of anxiety in the past regarding difficulty related to infertility.       Substance Use History  ETOH/alcohol use: occasional, social use     Other substance or supplement use: denied., Tobacco; denied, coffee 1 /day    Social History  Significant relationships/Support Network: good support system, good relationship with spouse or significant other and gets along well with co-workers.   Cultural Practices Sabianism/Spiritual Beliefs pertinent to treatment: [employer].  Additional pertinent historical information includes: college graduate; Working Full-Time, currently employed. Patient is currently working at a .       Symptoms  Depression symptoms: PHQ 9:  Little Interest or Pleasure in Doing Things: 0-->not at all    Feeling Down, Depressed or Hopeless: 0-->not at all    Trouble Falling or Staying Asleep, or Sleeping Too Much: 1-->several days    Feeling Tired or Having Little Energy: 0-->not at all    Poor Appetite or Overeatin-->several days    Feeling Bad about Yourself - or that You are a Failure or Have Let Yourself or Your Family Down: 0-->not at all    Trouble Concentrating on Things, Such as Reading the Newspaper or Watching Television: 0-->not at all    Moving or Speaking So Slowly that Other People Could Have Noticed? Or the Opposite - Being So Fidgety: 0-->not at all    Thoughts that You Would be Better Off Dead or of Hurting Yourself in Some Way: 0-->not at all    PHQ-9: Brief Depression Severity Measure Score: 2    If You Checked Off Any Problems, How Difficult Have These Problems Made It For You to Do Your Work, Take Care of Things at Home, or Get Along with Other  People?: not difficult at all      Anxiety symptoms:YAZMIN-7  Feeling nervous, anxious or on edge: 1-->Several days    Not being able to stop or control worryin-->Several days    Worrying too much about different things: 0-->Not at all    Trouble relaxin-->Several days    Being so restless that it is hard to sit still: 0-->Not at all    Becoming easily annoyed or irritable: 0-->Not at all    Feeling afraid as if something awful might happen: 1-->Several days      GAD7 Total Score: : 4      If you checked off any problems, how difficult have these made it for you to do your work, take care of things at home, or get along with other people?: Not difficult at all      Additional reported symptoms: N/A      OBJECTIVE     Mental Status Exam  Appearance: Well Groomed  Speech: Regular  Psychomotor Functioning: WNL  Eye Contact: WNL  Orientation: Fully oriented  Attention: WNL  Concentration: WNL  Recent Memory: WNL  Remote Memory: WNL  Thought Content: Appropriate  Thought Process: WNL  Insight: Intact  Perceptual Function: Normal  Delusions: None or age appropriate  Sleeping: Decreased  Appetite: No Change  Libido: No change  Affect: Full Range  Mood: Euthymic (normal)      ASSESSMENT     Psychotropic medications: no known adherence challenges, effective   Current Outpatient Medications   Medication Sig Dispense Refill   • hydrOXYzine (VistariL) 50 mg capsule Take 1 capsule (50 mg total) by mouth 3 (three) times a day as needed for anxiety. 30 capsule 0   • omeprazole (PriLOSEC) 40 mg capsule      • sertraline (ZOLOFT) 25 mg tablet Take 1 tablet (25 mg total) by mouth daily. 30 tablet 0     No current facility-administered medications for this visit.       Suicidal Ideation/Homicidal Ideation Risk Assessment  Risk Factors: Increased stress and anxiety related to her husbands recent cancer diagnsois  Protective factors: Effective and accessible mental health care , Connectedness to individuals, family, community, and  social institutions and Problem-solving and conflict resolution skills    Suicidal Ideation: Not Present, No intention or plan.  Self Injurious Behavior:  Not Present, No intention or plan.  Homicidal Behavior: Not Present, No intention or plan.  Estimate of Current Risk: Minimal risk    Plan for Safety-   N/A: risk is assessed to be minimal, therefore developing a safety plan is not indicated at this time.    Screening measures administered during this visit  PHQ-9: Brief Depression Severity Measure Score: 2  GAD7 Total Score: : 4      ASSESSMENT / IMPRESSIONS  Nicole Zawada Care seems to be experiencing difficulty coping with her 's recent cancer diagnosis. She appears to be struggling with increased stress and worrisome thoughts. It is likely that CBT will be helpful in reducing anxious thoughts and feelings and assist with not discounting the positives. Further evaluation needed to rule out the possibility of an anxiety or mood disorder.   Severity: mild, chronicity: acute, duration:3 week(s)  Associated factors include being the primary caregiver for her  and three year old daughter  Prognostic protective factors include, openness to treatment, awareness of thoughts and feelings, and good support system . Prognostic risk factors include, anxiety-related thoughts and focusing on the worst case scenerio.      PLAN     Goals     •  balance stress (pt-stated)     •  Decrease Anxiety       Begin to focus on positive thoughts and feelings.           Psychoeducation provided  Introduced and explained the cognitive model and the relationship between thoughts, feelings, and behaviors.   Provided psychoeducation on the cycle of anxiety.    Explained progressive muscle relaxation and its benefits.      Recommendations for treatment: Individual Therapy, bi-weekly.     Recommendations for Interventions:Anxiety Reduction Techniques  Cognitive Behavior Training  Psychoeducation      Next visit plan  Begin to  monitor anxious thoughts as they arise, rate the thought on a scale from 1(not true at all) - 100 (most true) you believe the thought to be. Consider returning to that thought once anxiety has decreased and rate the thought again. Additionally, begin to practice progressive muscle relaxation techniques. Next session, consider introducing cognitive distortions.

## 2021-07-27 RX ORDER — SERTRALINE HYDROCHLORIDE 25 MG/1
25 TABLET, FILM COATED ORAL DAILY
Qty: 30 TABLET | Refills: 0 | Status: SHIPPED | OUTPATIENT
Start: 2021-07-27 | End: 2021-07-27 | Stop reason: SDUPTHER

## 2021-07-27 RX ORDER — SERTRALINE HYDROCHLORIDE 25 MG/1
25 TABLET, FILM COATED ORAL DAILY
Qty: 90 TABLET | Refills: 1 | Status: SHIPPED | OUTPATIENT
Start: 2021-07-27 | End: 2021-11-01 | Stop reason: DRUGHIGH

## 2021-07-27 NOTE — TELEPHONE ENCOUNTER
Talked to patient, doing well on medication, would like to continue medication. Send to [facility] and [facility]

## 2021-07-27 NOTE — PROGRESS NOTES
I have discussed the management of this patient with the Psychology Resident/Post-Doctoral Fellow. I reviewed the Psychology Resident's/Post-Doctoral Fellow's note and agree with the documented findings and plan of care, except for my comments below or within the additional notes today.

## 2021-08-09 ENCOUNTER — OFFICE VISIT (OUTPATIENT)
Dept: PRIMARY CARE | Facility: CLINIC | Age: 36
End: 2021-08-09
Payer: COMMERCIAL

## 2021-08-09 DIAGNOSIS — F43.22 ADJUSTMENT DISORDER WITH ANXIOUS MOOD: Primary | ICD-10-CM

## 2021-08-09 NOTE — PROGRESS NOTES
Integrated Behavioral Health Follow-up Visit Note  Visit Type Performed: in-office  Visit number: 2     Duration: 30 minutes   Nicole Zawada Care 1985 a 35 y.o.female, presented today for a behavioral health visit.    SUBJECTIVE     Reason for visit: Follow-up regarding difficulty adjusting to her 's recent cancer diagnosis and increased stress.     Symptoms  Depression symptoms: no depressive symptoms at this time.   Anxiety symptoms: Excessive anxiety/ worry- yes, Difficulty controlling worry- yes and Sleep changes- yes  Additional reported symptoms: N/A      OBJECTIVE     Mental Status Evaluation  Patient's mood and affect were consistent with the context, and consistent with their baseline: Yes   Comments:  calm and pleasant, alert,oriented, in NAD with a full range of affect, normal behavior and no psychotic features    Additional Assessments completed this visit  N/A    Suicidal Ideation/Homicidal Ideation Risk Assessment: not assessed. If not assessed, reason:  Pt did not endorse suicidal ideation at the time of the initial evaluation and she has no significant risk factors  Risk Factors: Increased stress and anxiety  Protective factors: Effective and accessible mental health care , Connectedness to individuals, family, community, and social institutions and Problem-solving and conflict resolution skills    Estimate of Current Risk: Minimal risk    Plan for Safety-   N/A: risk is assessed to be minimal, therefore developing a safety plan is not indicated at this time.    Interventions  Anxiety Reduction Techniques  Cognitive Behavior Training  Psychoeducation  We discussed anxious automatic thoughts and when they occur. Cognitive distortions were introduced. Additionally, psychoeducation was provided on mindfulness and self-care.       Psychotropic medications: no known adherence challenges, effective   Current Outpatient Medications   Medication Sig Dispense Refill   • hydrOXYzine (VistariL) 50 mg  capsule Take 1 capsule (50 mg total) by mouth 3 (three) times a day as needed for anxiety. 30 capsule 0   • omeprazole (PriLOSEC) 40 mg capsule      • sertraline (ZOLOFT) 25 mg tablet Take 1 tablet (25 mg total) by mouth daily. 90 tablet 1     No current facility-administered medications for this visit.       ASSESSMENT       Progress  Patient's progress toward their goals is generally improving (Pt reported being able to focus on positive thoughts. She endorsed situations that lead to increase in anxiety and worry and the ability to problem-solve through those situations).  Patient's symptomology is well controlled (Pt reported Zoloft has helped improve mood. She reported decreased time spent on ruminating on anxious thoughts).        PLAN     Goals     •  balance stress (pt-stated)     •  Decrease Anxiety       Begin to focus on positive thoughts and feelings.             Psychoeducation provided on  Please see patient instructions.        Recommendations  Individual Therapy  30 minutes2 times monthly    Next visit plan:  Follow-up on homework.   Consider introducing cognitive restructuring or continue working on mindfulness practices.

## 2021-08-09 NOTE — PATIENT INSTRUCTIONS
Continue practicing progressive muscle relaxation and meditations apps  Consider writing down anxious thoughts in a journal to help with night time sleep

## 2021-08-12 LAB
ALBUMIN SERPL-MCNC: 4.7 G/DL (ref 3.8–4.8)
ALBUMIN/GLOB SERPL: 2.4 {RATIO} (ref 1.2–2.2)
ALP SERPL-CCNC: 42 IU/L (ref 48–121)
ALT SERPL-CCNC: 15 IU/L (ref 0–32)
AST SERPL-CCNC: 20 IU/L (ref 0–40)
BASOPHILS # BLD AUTO: 0 X10E3/UL (ref 0–0.2)
BASOPHILS NFR BLD AUTO: 1 %
BILIRUB DIRECT SERPL-MCNC: 0.11 MG/DL (ref 0–0.4)
BILIRUB SERPL-MCNC: 0.3 MG/DL (ref 0–1.2)
BUN SERPL-MCNC: 12 MG/DL (ref 6–20)
BUN/CREAT SERPL: 16 (ref 9–23)
CALCIUM SERPL-MCNC: 9.5 MG/DL (ref 8.7–10.2)
CHLORIDE SERPL-SCNC: 99 MMOL/L (ref 96–106)
CHOLEST SERPL-MCNC: 241 MG/DL (ref 100–199)
CO2 SERPL-SCNC: 24 MMOL/L (ref 20–29)
CREAT SERPL-MCNC: 0.77 MG/DL (ref 0.57–1)
EOSINOPHIL # BLD AUTO: 0.1 X10E3/UL (ref 0–0.4)
EOSINOPHIL NFR BLD AUTO: 2 %
ERYTHROCYTE [DISTWIDTH] IN BLOOD BY AUTOMATED COUNT: 11.9 % (ref 11.7–15.4)
GLOBULIN SER CALC-MCNC: 2 G/DL (ref 1.5–4.5)
GLUCOSE SERPL-MCNC: 82 MG/DL (ref 65–99)
HCT VFR BLD AUTO: 39.7 % (ref 34–46.6)
HDLC SERPL-MCNC: 81 MG/DL
HGB BLD-MCNC: 13.4 G/DL (ref 11.1–15.9)
IMM GRANULOCYTES # BLD AUTO: 0 X10E3/UL (ref 0–0.1)
IMM GRANULOCYTES NFR BLD AUTO: 0 %
LAB CORP EGFR IF AFRICN AM: 117 ML/MIN/1.73
LAB CORP EGFR IF NONAFRICN AM: 101 ML/MIN/1.73
LDLC SERPL CALC-MCNC: 146 MG/DL (ref 0–99)
LYMPHOCYTES # BLD AUTO: 1.8 X10E3/UL (ref 0.7–3.1)
LYMPHOCYTES NFR BLD AUTO: 35 %
MCH RBC QN AUTO: 31.3 PG (ref 26.6–33)
MCHC RBC AUTO-ENTMCNC: 33.8 G/DL (ref 31.5–35.7)
MCV RBC AUTO: 93 FL (ref 79–97)
MONOCYTES # BLD AUTO: 0.3 X10E3/UL (ref 0.1–0.9)
MONOCYTES NFR BLD AUTO: 7 %
NEUTROPHILS # BLD AUTO: 2.8 X10E3/UL (ref 1.4–7)
NEUTROPHILS NFR BLD AUTO: 55 %
PLATELET # BLD AUTO: 273 X10E3/UL (ref 150–450)
POTASSIUM SERPL-SCNC: 4.1 MMOL/L (ref 3.5–5.2)
PROT SERPL-MCNC: 6.7 G/DL (ref 6–8.5)
RBC # BLD AUTO: 4.28 X10E6/UL (ref 3.77–5.28)
SODIUM SERPL-SCNC: 136 MMOL/L (ref 134–144)
T4 FREE SERPL-MCNC: 1.03 NG/DL (ref 0.82–1.77)
TRIGL SERPL-MCNC: 85 MG/DL (ref 0–149)
TSH SERPL DL<=0.005 MIU/L-ACNC: 0.91 UIU/ML (ref 0.45–4.5)
VLDLC SERPL CALC-MCNC: 14 MG/DL (ref 5–40)
WBC # BLD AUTO: 5.1 X10E3/UL (ref 3.4–10.8)

## 2021-08-13 ENCOUNTER — TELEPHONE (OUTPATIENT)
Dept: PRIMARY CARE | Facility: CLINIC | Age: 36
End: 2021-08-13

## 2021-08-13 LAB
APPEARANCE UR: CLEAR
BACTERIA #/AREA URNS HPF: NORMAL /[HPF]
BILIRUB UR QL STRIP: NEGATIVE
CASTS URNS QL MICRO: NORMAL /LPF
COLOR UR: YELLOW
EPI CELLS #/AREA URNS HPF: NORMAL /HPF (ref 0–10)
GLUCOSE UR QL: NEGATIVE
HGB UR QL STRIP: NEGATIVE
KETONES UR QL STRIP: NEGATIVE
LAB CORP URINALYSIS REFLEX: NORMAL
LEUKOCYTE ESTERASE UR QL STRIP: NEGATIVE
MICRO URNS: NORMAL
MICRO URNS: NORMAL
NITRITE UR QL STRIP: NEGATIVE
PH UR STRIP: 7.5 [PH] (ref 5–7.5)
PROT UR QL STRIP: NEGATIVE
RBC #/AREA URNS HPF: NORMAL /HPF (ref 0–2)
SP GR UR: 1.02 (ref 1–1.03)
UROBILINOGEN UR STRIP-MCNC: 0.2 MG/DL (ref 0.2–1)
WBC #/AREA URNS HPF: NORMAL /HPF (ref 0–5)

## 2021-08-13 NOTE — TELEPHONE ENCOUNTER
Description of result requested: Lab Work Results      Date of test or study: 8/12/21      Location where test or study completed: [facility]      Additional Comments:   Was able to get reults and saw some things she has questions on. Please call back and advise.        Next encounter with provider:  Visit date not found

## 2021-11-01 DIAGNOSIS — F39 MOOD DISORDER (CMS/HCC): Primary | ICD-10-CM

## 2021-11-01 RX ORDER — SERTRALINE HYDROCHLORIDE 50 MG/1
50 TABLET, FILM COATED ORAL DAILY
Qty: 30 TABLET | Refills: 0 | Status: SHIPPED | OUTPATIENT
Start: 2021-11-01 | End: 2021-11-24 | Stop reason: SDUPTHER

## 2021-11-24 DIAGNOSIS — F39 MOOD DISORDER (CMS/HCC): ICD-10-CM

## 2021-11-24 RX ORDER — SERTRALINE HYDROCHLORIDE 50 MG/1
TABLET, FILM COATED ORAL
Qty: 30 TABLET | Refills: 0 | OUTPATIENT
Start: 2021-11-24

## 2021-11-24 RX ORDER — SERTRALINE HYDROCHLORIDE 50 MG/1
50 TABLET, FILM COATED ORAL DAILY
Qty: 90 TABLET | Refills: 0 | Status: SHIPPED | OUTPATIENT
Start: 2021-11-24 | End: 2022-02-04

## 2021-11-24 NOTE — TELEPHONE ENCOUNTER
Left message for patient to call back to see how doing on medication after increase in dose. Awaiting call back

## 2022-10-27 ENCOUNTER — OFFICE VISIT (OUTPATIENT)
Dept: PRIMARY CARE | Facility: CLINIC | Age: 37
End: 2022-10-27
Payer: COMMERCIAL

## 2022-10-27 VITALS
OXYGEN SATURATION: 99 % | BODY MASS INDEX: 26.5 KG/M2 | WEIGHT: 155.2 LBS | SYSTOLIC BLOOD PRESSURE: 132 MMHG | RESPIRATION RATE: 18 BRPM | DIASTOLIC BLOOD PRESSURE: 98 MMHG | TEMPERATURE: 98.2 F | HEART RATE: 98 BPM | HEIGHT: 64 IN

## 2022-10-27 DIAGNOSIS — F39 MOOD DISORDER (CMS/HCC): ICD-10-CM

## 2022-10-27 DIAGNOSIS — Z20.821 EXPOSURE TO ZIKA VIRUS: ICD-10-CM

## 2022-10-27 DIAGNOSIS — Z00.00 ENCOUNTER FOR GENERAL ADULT MEDICAL EXAMINATION WITHOUT ABNORMAL FINDINGS: Primary | ICD-10-CM

## 2022-10-27 DIAGNOSIS — Z23 NEED FOR INFLUENZA VACCINATION: ICD-10-CM

## 2022-10-27 PROCEDURE — 90471 IMMUNIZATION ADMIN: CPT | Performed by: INTERNAL MEDICINE

## 2022-10-27 PROCEDURE — 3008F BODY MASS INDEX DOCD: CPT | Performed by: INTERNAL MEDICINE

## 2022-10-27 PROCEDURE — 90686 IIV4 VACC NO PRSV 0.5 ML IM: CPT | Performed by: INTERNAL MEDICINE

## 2022-10-27 PROCEDURE — 99395 PREV VISIT EST AGE 18-39: CPT | Mod: 25 | Performed by: INTERNAL MEDICINE

## 2022-10-27 RX ORDER — HYDROXYZINE HYDROCHLORIDE 25 MG/1
25 TABLET, FILM COATED ORAL 3 TIMES DAILY PRN
Qty: 30 TABLET | Refills: 0 | Status: ON HOLD | OUTPATIENT
Start: 2022-10-27 | End: 2023-12-30

## 2022-10-27 ASSESSMENT — ENCOUNTER SYMPTOMS
EYE PAIN: 0
APPETITE CHANGE: 0
WEAKNESS: 0
FEVER: 0
ENDOCRINE NEGATIVE: 1
DIZZINESS: 0
CHEST TIGHTNESS: 0
COUGH: 0
ACTIVITY CHANGE: 0
HEADACHES: 0
FATIGUE: 0
SHORTNESS OF BREATH: 0
PALPITATIONS: 0

## 2022-10-27 ASSESSMENT — PATIENT HEALTH QUESTIONNAIRE - PHQ9: SUM OF ALL RESPONSES TO PHQ9 QUESTIONS 1 & 2: 0

## 2022-10-27 NOTE — PROGRESS NOTES
11    Subjective     Patient ID: Nicole Zawada Care is a 37 y.o. female.    Presents today for PE    Diet-stable   Exercise-PELOTON  Sleep-doing ok  Mood/anx-doing well on med mgmt    Chronic -  New-  Concern over weight gain-     Fertility-discussion/upcoming       Allergies  Meds  Problems       Review of Systems   Constitutional: Negative for activity change, appetite change, fatigue and fever.   HENT: Negative.    Eyes: Negative for pain and visual disturbance.   Respiratory: Negative for cough, chest tightness and shortness of breath.    Cardiovascular: Negative for chest pain and palpitations.   Endocrine: Negative.    Neurological: Negative for dizziness, weakness and headaches.       Current Outpatient Medications   Medication Sig Dispense Refill    hydrOXYzine (ATARAX) 25 mg tablet Take 1 tablet (25 mg total) by mouth 3 (three) times a day as needed for anxiety. 30 tablet 0    omeprazole (PriLOSEC) 40 mg capsule       sertraline (ZOLOFT) 50 mg tablet TAKE 1 TABLET DAILY 90 tablet 0     No current facility-administered medications for this visit.     No past medical history on file.  Family History   Problem Relation Age of Onset    No Known Problems Biological Mother     No Known Problems Biological Father     Breast cancer Maternal Grandmother     Crohn's disease Biological Sister     No Known Problems Maternal Grandfather     Hypertension Paternal Grandfather      No past surgical history on file.  Social History     Socioeconomic History    Marital status:      Spouse name: Not on file    Number of children: 1    Years of education: Not on file    Highest education level: Not on file   Occupational History    Occupation:    Tobacco Use    Smoking status: Never    Smokeless tobacco: Never   Vaping Use    Vaping Use: Never used   Substance and Sexual Activity    Alcohol use: Yes     Comment: social    Drug use: No    Sexual activity: Yes     Partners: Male      "Birth control/protection: None   Other Topics Concern    Not on file   Social History Narrative    Teacher     Social Determinants of Health     Financial Resource Strain: Not on file   Food Insecurity: Not on file   Transportation Needs: Not on file   Physical Activity: Not on file   Stress: Not on file   Social Connections: Not on file   Intimate Partner Violence: Not on file   Housing Stability: Not on file     Allergies   Allergen Reactions    No Known Allergies        Objective     Vitals:    10/27/22 0900   BP: (!) 132/98   Pulse: 98   Resp: 18   Temp: 36.8 °C (98.2 °F)   SpO2: 99%   Weight: 70.4 kg (155 lb 3.2 oz)   Height: 1.626 m (5' 4\")     Body mass index is 26.64 kg/m².    Physical Exam  Vitals and nursing note reviewed.   Constitutional:       Appearance: She is well-developed and well-nourished.   HENT:      Head: Normocephalic and atraumatic.   Eyes:      Extraocular Movements: EOM normal.      Pupils: Pupils are equal, round, and reactive to light.   Cardiovascular:      Rate and Rhythm: Normal rate and regular rhythm.   Pulmonary:      Effort: Pulmonary effort is normal.      Breath sounds: Normal breath sounds.   Musculoskeletal:      Cervical back: Normal range of motion.   Skin:     General: Skin is warm and dry.   Neurological:      Mental Status: She is alert and oriented to person, place, and time.         Assessment/Plan   Problem List Items Addressed This Visit        Mental Health    Mood disorder (CMS/HCC)     Will start to titrate down off of sertraline- to 25 mg for a t least a few weeks- see if tolerates decreased dose-   will add on low dose hydroxyzine in preparation for breakthrough moments and possible to see if can use as daily med mgmt for anxiety.          Relevant Medications    hydrOXYzine (ATARAX) 25 mg tablet       Other    Encounter for general adult medical examination without abnormal findings - Primary     VAX-utd  LABS-ordered  Specialist-as directed   Diet -well " balanced in moderation  Exercise - Maintain regular activity  Rec- RTO annually for PE and PRN           Relevant Orders    Comprehensive metabolic panel    CBC and differential    Lipid panel    Urinalysis with Reflex Culture    TSH w reflex FT4    Need for influenza vaccination    Relevant Orders    Influenza vaccine quadrivalent preservative free 6 mon and older IM (FluLaval) (Completed)    Exposure to Zika virus    Relevant Orders    ZIKA VIRUS ARETHA COMPREHENSIVE     Orders Placed This Encounter   Procedures    Influenza vaccine quadrivalent preservative free 6 mon and older IM (FluLaval)    Comprehensive metabolic panel     Standing Status:   Future     Number of Occurrences:   1     Standing Expiration Date:   10/27/2023     Order Specific Question:   Release to patient     Answer:   Immediate    CBC and differential     Standing Status:   Future     Number of Occurrences:   1     Standing Expiration Date:   10/27/2023     Order Specific Question:   Release to patient     Answer:   Immediate    Lipid panel     Standing Status:   Future     Number of Occurrences:   1     Standing Expiration Date:   10/27/2023     Order Specific Question:   Release to patient     Answer:   Immediate    Urinalysis with Reflex Culture     Standing Status:   Future     Number of Occurrences:   1     Standing Expiration Date:   10/27/2023     Order Specific Question:   Release to patient     Answer:   Immediate    TSH w reflex FT4     Standing Status:   Future     Number of Occurrences:   1     Standing Expiration Date:   10/27/2023     Order Specific Question:   Release to patient     Answer:   Immediate    ZIKA VIRUS ARETHA COMPREHENSIVE     Standing Status:   Future     Number of Occurrences:   1     Standing Expiration Date:   10/27/2023     Order Specific Question:   Release to patient     Answer:   Immediate

## 2022-10-27 NOTE — ASSESSMENT & PLAN NOTE
Will start to titrate down off of sertraline- to 25 mg for a t least a few weeks- see if tolerates decreased dose-   will add on low dose hydroxyzine in preparation for breakthrough moments and possible to see if can use as daily med mgmt for anxiety.

## 2022-10-27 NOTE — ASSESSMENT & PLAN NOTE
VAX-utd  LABS-ordered  Specialist-as directed   Diet -well balanced in moderation  Exercise - Maintain regular activity  Rec- RTO annually for PE and PRN

## 2022-11-01 DIAGNOSIS — F39 MOOD DISORDER (CMS/HCC): ICD-10-CM

## 2022-11-02 RX ORDER — SERTRALINE HYDROCHLORIDE 25 MG/1
25 TABLET, FILM COATED ORAL DAILY
Qty: 30 TABLET | Refills: 0 | Status: SHIPPED | OUTPATIENT
Start: 2022-11-02 | End: 2022-12-01

## 2022-11-02 RX ORDER — SERTRALINE HYDROCHLORIDE 50 MG/1
TABLET, FILM COATED ORAL
Qty: 90 TABLET | Refills: 3 | OUTPATIENT
Start: 2022-11-02

## 2022-11-02 NOTE — TELEPHONE ENCOUNTER
Talked to patient, patient decreased to 25mg and doing well. Patient is weaning off medication at this time. Will send 30 day supply over for 25mg and will follow up with office when off medication

## 2022-12-01 DIAGNOSIS — F39 MOOD DISORDER (CMS/HCC): ICD-10-CM

## 2022-12-01 RX ORDER — SERTRALINE HYDROCHLORIDE 25 MG/1
25 TABLET, FILM COATED ORAL DAILY
Qty: 30 TABLET | Refills: 0 | Status: SHIPPED | OUTPATIENT
Start: 2022-12-01 | End: 2023-02-24 | Stop reason: SDUPTHER

## 2023-01-20 LAB
ALBUMIN SERPL-MCNC: 4.8 G/DL (ref 3.8–4.8)
ALBUMIN/GLOB SERPL: 2.1 {RATIO} (ref 1.2–2.2)
ALP SERPL-CCNC: 54 IU/L (ref 44–121)
ALT SERPL-CCNC: 24 IU/L (ref 0–32)
APPEARANCE UR: CLEAR
AST SERPL-CCNC: 26 IU/L (ref 0–40)
BACTERIA #/AREA URNS HPF: NORMAL /[HPF]
BASOPHILS # BLD AUTO: 0 X10E3/UL (ref 0–0.2)
BASOPHILS NFR BLD AUTO: 1 %
BILIRUB SERPL-MCNC: 0.5 MG/DL (ref 0–1.2)
BILIRUB UR QL STRIP: NEGATIVE
BUN SERPL-MCNC: 11 MG/DL (ref 6–20)
BUN/CREAT SERPL: 15 (ref 9–23)
CALCIUM SERPL-MCNC: 9.6 MG/DL (ref 8.7–10.2)
CASTS URNS QL MICRO: NORMAL /LPF
CHLORIDE SERPL-SCNC: 101 MMOL/L (ref 96–106)
CHOLEST SERPL-MCNC: 285 MG/DL (ref 100–199)
CO2 SERPL-SCNC: 24 MMOL/L (ref 20–29)
COLOR UR: YELLOW
CREAT SERPL-MCNC: 0.72 MG/DL (ref 0.57–1)
EGFRCR SERPLBLD CKD-EPI 2021: 111 ML/MIN/1.73
EOSINOPHIL # BLD AUTO: 0.1 X10E3/UL (ref 0–0.4)
EOSINOPHIL NFR BLD AUTO: 2 %
EPI CELLS #/AREA URNS HPF: NORMAL /HPF (ref 0–10)
ERYTHROCYTE [DISTWIDTH] IN BLOOD BY AUTOMATED COUNT: 13 % (ref 11.7–15.4)
GLOBULIN SER CALC-MCNC: 2.3 G/DL (ref 1.5–4.5)
GLUCOSE SERPL-MCNC: 92 MG/DL (ref 70–99)
GLUCOSE UR QL STRIP: NEGATIVE
HCT VFR BLD AUTO: 39.2 % (ref 34–46.6)
HDLC SERPL-MCNC: 89 MG/DL
HGB BLD-MCNC: 13.2 G/DL (ref 11.1–15.9)
HGB UR QL STRIP: NEGATIVE
IMM GRANULOCYTES # BLD AUTO: 0 X10E3/UL (ref 0–0.1)
IMM GRANULOCYTES NFR BLD AUTO: 0 %
KETONES UR QL STRIP: NEGATIVE
LAB CORP URINALYSIS REFLEX: ABNORMAL
LDLC SERPL CALC-MCNC: 176 MG/DL (ref 0–99)
LEUKOCYTE ESTERASE UR QL STRIP: NEGATIVE
LYMPHOCYTES # BLD AUTO: 1.5 X10E3/UL (ref 0.7–3.1)
LYMPHOCYTES NFR BLD AUTO: 36 %
MCH RBC QN AUTO: 31.1 PG (ref 26.6–33)
MCHC RBC AUTO-ENTMCNC: 33.7 G/DL (ref 31.5–35.7)
MCV RBC AUTO: 92 FL (ref 79–97)
MICRO URNS: ABNORMAL
MICRO URNS: ABNORMAL
MONOCYTES # BLD AUTO: 0.2 X10E3/UL (ref 0.1–0.9)
MONOCYTES NFR BLD AUTO: 6 %
NEUTROPHILS # BLD AUTO: 2.4 X10E3/UL (ref 1.4–7)
NEUTROPHILS NFR BLD AUTO: 55 %
NITRITE UR QL STRIP: NEGATIVE
PH UR STRIP: 8 [PH] (ref 5–7.5)
PLATELET # BLD AUTO: 311 X10E3/UL (ref 150–450)
POTASSIUM SERPL-SCNC: 4.5 MMOL/L (ref 3.5–5.2)
PROT SERPL-MCNC: 7.1 G/DL (ref 6–8.5)
PROT UR QL STRIP: NEGATIVE
RBC # BLD AUTO: 4.25 X10E6/UL (ref 3.77–5.28)
RBC #/AREA URNS HPF: NORMAL /HPF (ref 0–2)
SODIUM SERPL-SCNC: 139 MMOL/L (ref 134–144)
SP GR UR STRIP: 1.02 (ref 1–1.03)
T4 FREE SERPL-MCNC: 1.08 NG/DL (ref 0.82–1.77)
TRIGL SERPL-MCNC: 117 MG/DL (ref 0–149)
TSH SERPL DL<=0.005 MIU/L-ACNC: 1.66 UIU/ML (ref 0.45–4.5)
UROBILINOGEN UR STRIP-MCNC: 0.2 MG/DL (ref 0.2–1)
VLDLC SERPL CALC-MCNC: 20 MG/DL (ref 5–40)
WBC # BLD AUTO: 4.2 X10E3/UL (ref 3.4–10.8)
WBC #/AREA URNS HPF: NORMAL /HPF (ref 0–5)

## 2023-01-23 LAB — ZIKV IGM SERPL QL IA: NORMAL

## 2023-02-24 DIAGNOSIS — F39 MOOD DISORDER (CMS/HCC): ICD-10-CM

## 2023-02-27 RX ORDER — SERTRALINE HYDROCHLORIDE 25 MG/1
25 TABLET, FILM COATED ORAL DAILY
Qty: 30 TABLET | Refills: 0 | Status: SHIPPED | OUTPATIENT
Start: 2023-02-27 | End: 2023-05-01

## 2023-05-01 DIAGNOSIS — F39 MOOD DISORDER (CMS/HCC): ICD-10-CM

## 2023-05-01 RX ORDER — SERTRALINE HYDROCHLORIDE 25 MG/1
25 TABLET, FILM COATED ORAL DAILY
Qty: 90 TABLET | Refills: 1 | Status: SHIPPED | OUTPATIENT
Start: 2023-05-01 | End: 2023-09-11 | Stop reason: SDUPTHER

## 2023-05-01 NOTE — TELEPHONE ENCOUNTER
[employer] message sent to the patient asking if she is currently still taking the Zoloft, as she was trying to wean off. Awaiting response.

## 2023-06-12 LAB — HIV 1+2 AB+HIV1 P24 AG SERPL QL IA: NONREACTIVE

## 2023-10-26 ENCOUNTER — TELEPHONE (OUTPATIENT)
Dept: PRIMARY CARE | Facility: CLINIC | Age: 38
End: 2023-10-26
Payer: COMMERCIAL

## 2023-10-26 NOTE — TELEPHONE ENCOUNTER
[facility] Appointment Request   Provider: Dr. Cortes  Appointment Type: EPP  Reason for Visit: Physical, last pe 10/27/22  Available Day and Time: Feb or March  Best Contact Number: [phone]    The practice will reach out to schedule your appointment within the next 2 business days.

## 2023-11-07 ENCOUNTER — TRANSCRIBE ORDERS (OUTPATIENT)
Dept: REGISTRATION | Facility: HOSPITAL | Age: 38
End: 2023-11-07

## 2023-11-07 ENCOUNTER — HOSPITAL ENCOUNTER (OUTPATIENT)
Dept: RADIOLOGY | Facility: HOSPITAL | Age: 38
Discharge: HOME | End: 2023-11-07
Attending: OBSTETRICS & GYNECOLOGY
Payer: COMMERCIAL

## 2023-11-07 DIAGNOSIS — I82.409 ACUTE EMBOLISM AND THROMBOSIS OF UNSPECIFIED DEEP VEINS OF UNSPECIFIED LOWER EXTREMITY (CMS/HCC): Primary | ICD-10-CM

## 2023-11-07 DIAGNOSIS — I82.409 ACUTE EMBOLISM AND THROMBOSIS OF UNSPECIFIED DEEP VEINS OF UNSPECIFIED LOWER EXTREMITY (CMS/HCC): ICD-10-CM

## 2023-11-07 PROCEDURE — 93971 EXTREMITY STUDY: CPT | Mod: RT

## 2023-12-19 LAB — GP B STREP SPEC QL CULT: NORMAL

## 2023-12-30 ENCOUNTER — ANESTHESIA (INPATIENT)
Dept: OBSTETRICS AND GYNECOLOGY | Facility: HOSPITAL | Age: 38
End: 2023-12-30
Payer: COMMERCIAL

## 2023-12-30 ENCOUNTER — HOSPITAL ENCOUNTER (INPATIENT)
Facility: HOSPITAL | Age: 38
LOS: 2 days | Discharge: HOME | End: 2024-01-01
Attending: STUDENT IN AN ORGANIZED HEALTH CARE EDUCATION/TRAINING PROGRAM | Admitting: STUDENT IN AN ORGANIZED HEALTH CARE EDUCATION/TRAINING PROGRAM
Payer: COMMERCIAL

## 2023-12-30 ENCOUNTER — ANESTHESIA EVENT (INPATIENT)
Dept: OBSTETRICS AND GYNECOLOGY | Facility: HOSPITAL | Age: 38
End: 2023-12-30
Payer: COMMERCIAL

## 2023-12-30 PROBLEM — Z34.90 TERM PREGNANCY: Status: ACTIVE | Noted: 2023-12-30

## 2023-12-30 PROBLEM — E66.9 OBESITY: Status: ACTIVE | Noted: 2023-12-30

## 2023-12-30 LAB
ABO + RH BLD: NORMAL
BLD GP AB SCN SERPL QL: NEGATIVE
D AG BLD QL: POSITIVE
ERYTHROCYTE [DISTWIDTH] IN BLOOD BY AUTOMATED COUNT: 12.8 % (ref 11.7–14.4)
HBV SURFACE AG SER QL: NONREACTIVE
HCT VFR BLDCO AUTO: 35.5 % (ref 35–45)
HGB BLD-MCNC: 11.6 G/DL (ref 11.8–15.7)
LABORATORY COMMENT REPORT: NORMAL
MCH RBC QN AUTO: 30.4 PG (ref 28–33.2)
MCHC RBC AUTO-ENTMCNC: 32.7 G/DL (ref 32.2–35.5)
MCV RBC AUTO: 92.9 FL (ref 83–98)
PDW BLD AUTO: 12 FL (ref 9.4–12.3)
PLATELET # BLD AUTO: 183 K/UL (ref 150–369)
RBC # BLD AUTO: 3.82 M/UL (ref 3.93–5.22)
SPECIMEN EXP DATE BLD: NORMAL
T PALLIDUM AB SER QL IF: NONREACTIVE
WBC # BLD AUTO: 7.69 K/UL (ref 3.8–10.5)

## 2023-12-30 PROCEDURE — 87340 HEPATITIS B SURFACE AG IA: CPT | Performed by: STUDENT IN AN ORGANIZED HEALTH CARE EDUCATION/TRAINING PROGRAM

## 2023-12-30 PROCEDURE — 86900 BLOOD TYPING SEROLOGIC ABO: CPT

## 2023-12-30 PROCEDURE — 86901 BLOOD TYPING SEROLOGIC RH(D): CPT

## 2023-12-30 PROCEDURE — 25000000 HC PHARMACY GENERAL: Performed by: ANESTHESIOLOGY

## 2023-12-30 PROCEDURE — 25800000 HC PHARMACY IV SOLUTIONS

## 2023-12-30 PROCEDURE — 27200130 HC EPIDURAL ANES TRAY

## 2023-12-30 PROCEDURE — 25000000 HC PHARMACY GENERAL: Performed by: STUDENT IN AN ORGANIZED HEALTH CARE EDUCATION/TRAINING PROGRAM

## 2023-12-30 PROCEDURE — 37000005 HC ANESTHESIA EPIDURAL/SPINAL: Performed by: ANESTHESIOLOGY

## 2023-12-30 PROCEDURE — 63600000 HC DRUGS/DETAIL CODE: Performed by: ANESTHESIOLOGY

## 2023-12-30 PROCEDURE — 86850 RBC ANTIBODY SCREEN: CPT

## 2023-12-30 PROCEDURE — 86780 TREPONEMA PALLIDUM: CPT | Performed by: STUDENT IN AN ORGANIZED HEALTH CARE EDUCATION/TRAINING PROGRAM

## 2023-12-30 PROCEDURE — 63600000 HC DRUGS/DETAIL CODE

## 2023-12-30 PROCEDURE — 63700000 HC SELF-ADMINISTRABLE DRUG: Performed by: STUDENT IN AN ORGANIZED HEALTH CARE EDUCATION/TRAINING PROGRAM

## 2023-12-30 PROCEDURE — 12000000 HC ROOM AND CARE MED/SURG

## 2023-12-30 PROCEDURE — 36415 COLL VENOUS BLD VENIPUNCTURE: CPT | Performed by: STUDENT IN AN ORGANIZED HEALTH CARE EDUCATION/TRAINING PROGRAM

## 2023-12-30 PROCEDURE — 63600000 HC DRUGS/DETAIL CODE: Mod: JZ | Performed by: STUDENT IN AN ORGANIZED HEALTH CARE EDUCATION/TRAINING PROGRAM

## 2023-12-30 PROCEDURE — 85027 COMPLETE CBC AUTOMATED: CPT | Performed by: STUDENT IN AN ORGANIZED HEALTH CARE EDUCATION/TRAINING PROGRAM

## 2023-12-30 PROCEDURE — 25000000 HC PHARMACY GENERAL

## 2023-12-30 PROCEDURE — 0HQ9XZZ REPAIR PERINEUM SKIN, EXTERNAL APPROACH: ICD-10-PCS | Performed by: STUDENT IN AN ORGANIZED HEALTH CARE EDUCATION/TRAINING PROGRAM

## 2023-12-30 PROCEDURE — 72000011 HC VAGINAL DELIVERY LEVEL 1

## 2023-12-30 RX ORDER — OXYTOCIN/0.9 % SODIUM CHLORIDE 30/500 ML
0-20 PLASTIC BAG, INJECTION (ML) INTRAVENOUS CONTINUOUS
Status: DISCONTINUED | OUTPATIENT
Start: 2023-12-30 | End: 2023-12-30

## 2023-12-30 RX ORDER — CALCIUM CARBONATE 200(500)MG
500 TABLET,CHEWABLE ORAL EVERY 4 HOURS PRN
Status: DISCONTINUED | OUTPATIENT
Start: 2023-12-30 | End: 2024-01-01 | Stop reason: HOSPADM

## 2023-12-30 RX ORDER — SERTRALINE HYDROCHLORIDE 25 MG/1
50 TABLET, FILM COATED ORAL NIGHTLY
Status: DISCONTINUED | OUTPATIENT
Start: 2023-12-30 | End: 2024-01-01 | Stop reason: HOSPADM

## 2023-12-30 RX ORDER — DIPHENHYDRAMINE HCL 50 MG/ML
25 VIAL (ML) INJECTION EVERY 6 HOURS PRN
Status: DISCONTINUED | OUTPATIENT
Start: 2023-12-30 | End: 2024-01-01 | Stop reason: HOSPADM

## 2023-12-30 RX ORDER — SODIUM CHLORIDE, SODIUM LACTATE, POTASSIUM CHLORIDE, CALCIUM CHLORIDE 600; 310; 30; 20 MG/100ML; MG/100ML; MG/100ML; MG/100ML
125 INJECTION, SOLUTION INTRAVENOUS CONTINUOUS
Status: DISCONTINUED | OUTPATIENT
Start: 2023-12-30 | End: 2023-12-30

## 2023-12-30 RX ORDER — FENTANYL/ROPIVACAINE/NS/PF 2-1500 MCG
PREFILLED PUMP RESERVOIR INJECTION
Status: COMPLETED
Start: 2023-12-30 | End: 2023-12-30

## 2023-12-30 RX ORDER — ALUMINUM HYDROXIDE, MAGNESIUM HYDROXIDE, AND SIMETHICONE 1200; 120; 1200 MG/30ML; MG/30ML; MG/30ML
30 SUSPENSION ORAL EVERY 4 HOURS PRN
Status: DISCONTINUED | OUTPATIENT
Start: 2023-12-30 | End: 2024-01-01 | Stop reason: HOSPADM

## 2023-12-30 RX ORDER — SERTRALINE HYDROCHLORIDE 25 MG/1
25 TABLET, FILM COATED ORAL DAILY
Status: DISCONTINUED | OUTPATIENT
Start: 2023-12-30 | End: 2023-12-30

## 2023-12-30 RX ORDER — OXYCODONE HYDROCHLORIDE 5 MG/1
5 TABLET ORAL EVERY 4 HOURS PRN
Status: DISCONTINUED | OUTPATIENT
Start: 2023-12-30 | End: 2024-01-01 | Stop reason: HOSPADM

## 2023-12-30 RX ORDER — ACETAMINOPHEN 325 MG/1
650 TABLET ORAL EVERY 4 HOURS PRN
Status: DISCONTINUED | OUTPATIENT
Start: 2023-12-30 | End: 2024-01-01 | Stop reason: HOSPADM

## 2023-12-30 RX ORDER — IBUPROFEN 600 MG/1
600 TABLET ORAL EVERY 6 HOURS
Status: DISCONTINUED | OUTPATIENT
Start: 2023-12-30 | End: 2024-01-01 | Stop reason: HOSPADM

## 2023-12-30 RX ORDER — ONDANSETRON HYDROCHLORIDE 2 MG/ML
4 INJECTION, SOLUTION INTRAVENOUS EVERY 6 HOURS PRN
Status: DISCONTINUED | OUTPATIENT
Start: 2023-12-30 | End: 2024-01-01 | Stop reason: HOSPADM

## 2023-12-30 RX ORDER — MISOPROSTOL 200 UG/1
1000 TABLET ORAL ONCE AS NEEDED
Status: DISCONTINUED | OUTPATIENT
Start: 2023-12-30 | End: 2023-12-30

## 2023-12-30 RX ORDER — EPHEDRINE SULFATE/0.9% NACL/PF 50 MG/5 ML
10 SYRINGE (ML) INTRAVENOUS EVERY 10 MIN PRN
Status: DISCONTINUED | OUTPATIENT
Start: 2023-12-30 | End: 2023-12-30

## 2023-12-30 RX ORDER — ONDANSETRON 4 MG/1
4 TABLET, ORALLY DISINTEGRATING ORAL EVERY 6 HOURS PRN
Status: DISCONTINUED | OUTPATIENT
Start: 2023-12-30 | End: 2024-01-01 | Stop reason: HOSPADM

## 2023-12-30 RX ORDER — BUPIVACAINE HYDROCHLORIDE 2.5 MG/ML
INJECTION, SOLUTION EPIDURAL; INFILTRATION; INTRACAUDAL
Status: COMPLETED | OUTPATIENT
Start: 2023-12-30 | End: 2023-12-30

## 2023-12-30 RX ORDER — FAMOTIDINE 20 MG/1
20 TABLET, FILM COATED ORAL DAILY
Status: DISCONTINUED | OUTPATIENT
Start: 2023-12-30 | End: 2024-01-01 | Stop reason: HOSPADM

## 2023-12-30 RX ORDER — OXYTOCIN/0.9 % SODIUM CHLORIDE 40/1000ML
PLASTIC BAG, INJECTION (ML) INTRAVENOUS CONTINUOUS
Status: DISCONTINUED | OUTPATIENT
Start: 2023-12-30 | End: 2023-12-30

## 2023-12-30 RX ORDER — LIDOCAINE HYDROCHLORIDE AND EPINEPHRINE 15; 5 MG/ML; UG/ML
INJECTION, SOLUTION EPIDURAL
Status: COMPLETED | OUTPATIENT
Start: 2023-12-30 | End: 2023-12-30

## 2023-12-30 RX ORDER — AMOXICILLIN 250 MG
1 CAPSULE ORAL 2 TIMES DAILY
Status: DISCONTINUED | OUTPATIENT
Start: 2023-12-30 | End: 2024-01-01 | Stop reason: HOSPADM

## 2023-12-30 RX ORDER — OXYTOCIN/0.9 % SODIUM CHLORIDE 40/1000ML
PLASTIC BAG, INJECTION (ML) INTRAVENOUS
Status: COMPLETED
Start: 2023-12-30 | End: 2023-12-30

## 2023-12-30 RX ORDER — OXYTOCIN/0.9 % SODIUM CHLORIDE 40/1000ML
500 PLASTIC BAG, INJECTION (ML) INTRAVENOUS ONCE
Status: COMPLETED | OUTPATIENT
Start: 2023-12-30 | End: 2023-12-30

## 2023-12-30 RX ORDER — METHYLERGONOVINE MALEATE 0.2 MG/ML
200 INJECTION INTRAVENOUS ONCE AS NEEDED
Status: DISCONTINUED | OUTPATIENT
Start: 2023-12-30 | End: 2023-12-30

## 2023-12-30 RX ORDER — DIPHENHYDRAMINE HCL 25 MG
25 CAPSULE ORAL EVERY 6 HOURS PRN
Status: DISCONTINUED | OUTPATIENT
Start: 2023-12-30 | End: 2024-01-01 | Stop reason: HOSPADM

## 2023-12-30 RX ORDER — CARBOPROST TROMETHAMINE 250 UG/ML
250 INJECTION, SOLUTION INTRAMUSCULAR ONCE AS NEEDED
Status: DISCONTINUED | OUTPATIENT
Start: 2023-12-30 | End: 2023-12-30

## 2023-12-30 RX ORDER — OXYTOCIN 10 [USP'U]/ML
10 INJECTION, SOLUTION INTRAMUSCULAR; INTRAVENOUS ONCE AS NEEDED
Status: DISCONTINUED | OUTPATIENT
Start: 2023-12-30 | End: 2023-12-30

## 2023-12-30 RX ORDER — TRANEXAMIC ACID 10 MG/ML
1000 INJECTION, SOLUTION INTRAVENOUS ONCE AS NEEDED
Status: DISCONTINUED | OUTPATIENT
Start: 2023-12-30 | End: 2023-12-30

## 2023-12-30 RX ORDER — LIDOCAINE HYDROCHLORIDE 10 MG/ML
0-30 INJECTION, SOLUTION EPIDURAL; INFILTRATION; INTRACAUDAL; PERINEURAL ONCE AS NEEDED
Status: DISCONTINUED | OUTPATIENT
Start: 2023-12-30 | End: 2024-01-01 | Stop reason: HOSPADM

## 2023-12-30 RX ORDER — FAMOTIDINE 20 MG/1
20 TABLET, FILM COATED ORAL DAILY
COMMUNITY

## 2023-12-30 RX ORDER — FENTANYL/ROPIVACAINE/NS/PF 2-1500 MCG
PREFILLED PUMP RESERVOIR INJECTION CONTINUOUS
Status: DISCONTINUED | OUTPATIENT
Start: 2023-12-30 | End: 2023-12-30

## 2023-12-30 RX ADMIN — LIDOCAINE HYDROCHLORIDE,EPINEPHRINE BITARTRATE 5 ML: 15; .005 INJECTION, SOLUTION EPIDURAL; INFILTRATION; INTRACAUDAL; PERINEURAL at 09:11

## 2023-12-30 RX ADMIN — BUPIVACAINE HYDROCHLORIDE 5 ML: 2.5 INJECTION, SOLUTION EPIDURAL; INFILTRATION; INTRACAUDAL at 09:11

## 2023-12-30 RX ADMIN — IBUPROFEN 600 MG: 600 TABLET, FILM COATED ORAL at 22:08

## 2023-12-30 RX ADMIN — OXYTOCIN 20 UNITS: 10 INJECTION, SOLUTION INTRAMUSCULAR; INTRAVENOUS at 09:52

## 2023-12-30 RX ADMIN — IBUPROFEN 600 MG: 600 TABLET, FILM COATED ORAL at 15:46

## 2023-12-30 RX ADMIN — Medication 50 ML: at 09:01

## 2023-12-30 RX ADMIN — SENNOSIDES AND DOCUSATE SODIUM 1 TABLET: 50; 8.6 TABLET ORAL at 20:28

## 2023-12-30 RX ADMIN — SERTRALINE 50 MG: 25 TABLET, FILM COATED ORAL at 22:09

## 2023-12-30 RX ADMIN — SODIUM CHLORIDE, POTASSIUM CHLORIDE, SODIUM LACTATE AND CALCIUM CHLORIDE 1000 ML: 600; 310; 30; 20 INJECTION, SOLUTION INTRAVENOUS at 06:43

## 2023-12-30 RX ADMIN — Medication 2 MILLI-UNITS/MIN: at 07:30

## 2023-12-30 RX ADMIN — Medication 20 UNITS: at 09:52

## 2023-12-30 RX ADMIN — SODIUM CHLORIDE, POTASSIUM CHLORIDE, SODIUM LACTATE AND CALCIUM CHLORIDE 125 ML/HR: 600; 310; 30; 20 INJECTION, SOLUTION INTRAVENOUS at 07:37

## 2023-12-30 NOTE — ANESTHESIA POSTPROCEDURE EVALUATION
Patient: Nicole Zawada Care    Procedure Summary     Date: 12/30/23 Room / Location:     Anesthesia Start: 0911 Anesthesia Stop: 0944    Procedure: Labor Analgesia Diagnosis:     Scheduled Providers:  Responsible Provider: Vince John MD    Anesthesia Type: labor epidural ASA Status: 2          Anesthesia Type: labor epidural  PACU Vitals    No data found in the last 10 encounters.           Anesthesia Post Evaluation    Pain management: adequate  Patient participation: complete - patient participated  Level of consciousness: awake and alert  Cardiovascular status: acceptable  Airway Patency: adequate  Respiratory status: acceptable  Hydration status: acceptable  Anesthetic complications: no

## 2023-12-30 NOTE — L&D DELIVERY NOTE
"OB Vaginal Delivery Note    Delivery:2023 at 9:44 AM   Patient:Nicole Zawada Care  :1985    Review the Delivery Report for details.     Delivery Details    Pre-Op Diagnosis: 1. 38 y.o.  intrauterine pregnancy at 38w2d with cedillo gestation.  2. PROM  3. Labor  4. AMA  5. H/o IVF   Post-Op Diagnosis: 1. Same  2. Delivery of live male infant \"Juarez\"   Delivery Clinician: Lindsey William    Delivery Assist;Delivery Nurse;Delivery Assist  Liz Lorenzo;Pura Davis;Giulia Parsons    Delivery Type: Vaginal, Spontaneous    Labor Complications:     EBL: 300  mL   Anesthesia Type: Epidural    Placenta Delivery Spontaneous    Placenta Disposition: discarded    Additional Specimens None      INFANT INFORMATION  Time of Birth:9:44 AM   Presentation: Vertex   Position:Right ,Occiput ,Anterior   Cord: 3 vessels ,Complications:None   [place] Sex: male   [place] Weight:       1 Minute 5 Minute 10 Minute   Apgar Totals: 9   9          Information for the patient's :  Zawada Care, Boy [[id number]]     [place] Cord Gas     None           Delivery Details:    Nicole Zawada Care is a 38 y.o.  at 38w2d gestation who presented to the hospital for Term pregnancy [Z34.90].  Her labor was augmented oxytocin.  The patient progressed to fully dilated and pushed for one contraction.  A viable  male  infant was delivered by Vaginal, Spontaneous  from Right ,Occiput ,Anterior .  The anterior and posterior shoulders delivered without difficulty followed by the remainder of the infant. A spontaneous cry was heard, and the infant appeared to be moving all 4 extremities. The cord was clamped and cut with 3 vessels  noted.  The placenta delivered spontaneously shortly thereafter.  Fundal massage was performed and the fundus was found to be firm, and lochia was within normal limits. The perineum, vagina, cervix were inspected, and the following lacerations were noted:      Episiotomy: None    Lacerations: "   Perineal: 1st  Repaired: Yes     Periurethral:    Repaired:     Labial:   Repaired:     Sulcus:   Repaired:     Vaginal:   Repaired:     Cervical:    Repaired:        Any lacerations were repaired in the usual fashion using Chromic  suture. Excellent hemostasis was noted. At the completion of the case, sponge and needle counts were correct. The infant and patient were left in the delivery room in stable condition.     Attending Attestation: I performed the procedure.    Lindsey William DO

## 2023-12-30 NOTE — PLAN OF CARE
Problem: Adult Inpatient Plan of Care  Goal: Plan of Care Review  Outcome: Progressing  Flowsheets (Taken 12/30/2023 0613)  Progress: improving  Outcome Evaluation: Pt admitted for labor. VSS. FHT cat I, planning epidural for pain management  Plan of Care Reviewed With:   patient   spouse   Plan of Care Review  Plan of Care Reviewed With: patient, spouse  Progress: improving  Outcome Evaluation: Pt admitted for labor. VSS. FHT cat I, planning epidural for pain management

## 2023-12-30 NOTE — ANESTHESIA PREPROCEDURE EVALUATION
Anesthesia ROS/MED HX    Anesthesia History - neg  Pulmonary - neg  Neuro/Psych - neg  Cardiovascular- neg  Hematological - neg  GI/Hepatic- neg  Musculoskeletal- neg  Renal Disease- neg  Endo/Other- neg      HPI: 38F  IOL requesting epidural.     Allergies:   Allergies   Allergen Reactions   • No Known Allergies        ROS: Denies any chest pain or shortness of breath    PMH:   Past Medical History:   Diagnosis Date   • Obesity        PSH: No past surgical history on file.    No current facility-administered medications on file prior to encounter.     Current Outpatient Medications on File Prior to Encounter   Medication Sig   • famotidine (PEPCID) 20 mg tablet Take 20 mg by mouth daily.   • prenatal vit no.130-iron-folic 27 mg iron- 800 mcg tablet tablet Take 1 tablet by mouth daily.   • sertraline (ZOLOFT) 25 mg tablet Take 1 tablet (25 mg total) by mouth daily.   • [DISCONTINUED] hydrOXYzine (ATARAX) 25 mg tablet Take 1 tablet (25 mg total) by mouth 3 (three) times a day as needed for anxiety.   • [DISCONTINUED] omeprazole (PriLOSEC) 40 mg capsule        CBC Results       23     0646 0737 0839    WBC 7.69 4.2 5.1    RBC 3.82 4.25 4.28    HGB 11.6 13.2 13.4    HCT 35.5 39.2 39.7    MCV 92.9 92 93    MCH 30.4 31.1 31.3    MCHC 32.7 33.7 33.8     311 273        BMP Results       23     0737 0839 1222     136 139    K 4.5 4.1 4.2    Cl 101 99 98    CO2 24 24 24    Glucose 92 82 74    BUN 11 12 13    Creatinine 0.72 0.77 0.73    EGFR 111 101 109      117 125         Comment for EGFR at 0839 on 21: **[facility] currently reports eGFR in compliance with the current**    recommendations of the [employer]. [facility] will    update reporting as new guidelines are published from the NKF-ASN    Task force.                    Physical Exam    Airway   Mallampati: I   TM distance: <3 FB   Neck ROM: full  Cardiovascular - normal   Rhythm:  regular   Rate: normalPulmonary - normal   clear to auscultation  Other Findings   Back - neg   landmarks identified    Dental - normal        Anesthesia Plan    Plan: labor epidural    Technique: epidural   2 ASA  Anesthetic plan and risks discussed with: patient  Postop Plan:   Patient Disposition: inpatient floor planned admission  Comments:    Plan: Epidural

## 2023-12-30 NOTE — H&P
HPI Nicole Zawada Care is a 38 y.o. female  at 38w2d with an estimated due date of 2024, by Patient Reported who presents with ruptured membranes overnight.  She's notes good fetal movement. Pregnancy achieved by IVF.    Last PO intake:   ,     ,      OB History:   OB History    Para Term  AB Living   1 0 0 0 0 0   SAB IAB Ectopic Multiple Live Births   0 0 0 0 0      # Outcome Date GA Lbr Milse/2nd Weight Sex Delivery Anes PTL Lv   1 Current                Medical History:   Past Medical History:   Diagnosis Date   • Obesity        Surgical History: No past surgical history on file.    Social History:   Social History     Socioeconomic History   • Marital status:    • Number of children: 1   Occupational History   • Occupation:    Tobacco Use   • Smoking status: Never   • Smokeless tobacco: Never   Vaping Use   • Vaping Use: Never used   Substance and Sexual Activity   • Alcohol use: Yes     Comment: social   • Drug use: No   • Sexual activity: Yes     Partners: Male     Birth control/protection: None   Social History Narrative    Teacher     Social Determinants of Health     Food Insecurity: No Food Insecurity (2023)    Hunger Vital Sign    • Worried About Running Out of Food in the Last Year: Never true    • Ran Out of Food in the Last Year: Never true        Family History:   Family History   Problem Relation Age of Onset   • No Known Problems Biological Mother    • No Known Problems Biological Father    • Breast cancer Maternal Grandmother    • Crohn's disease Biological Sister    • No Known Problems Maternal Grandfather    • Hypertension Paternal Grandfather        Allergies: No known allergies    Prior to Admission medications    Medication Sig Start Date End Date Taking? Authorizing Provider   famotidine (PEPCID) 20 mg tablet Take 20 mg by mouth daily.   Yes Provider, amaury Hurley MD   prenatal vit no.130-iron-folic 27 mg iron- 800 mcg tablet tablet  Take 1 tablet by mouth daily.   Yes Provider, [facility] MD Mei   sertraline (ZOLOFT) 25 mg tablet Take 1 tablet (25 mg total) by mouth daily. 23  Yes David Cortes DO   hydrOXYzine (ATARAX) 25 mg tablet Take 1 tablet (25 mg total) by mouth 3 (three) times a day as needed for anxiety. 10/27/22 12/30/23  David Cortes DO   omeprazole (PriLOSEC) 40 mg capsule  21  Provider, MD Mei       Review of Systems  Pertinent items are noted in HPI.    Objective     Vital Signs for the last 24 hours:  Temp:  [37 °C (98.6 °F)] 37 °C (98.6 °F)  Heart Rate:  [97] 97  Resp:  [18] 18  BP: (125)/(81) 125/81    Latest cervical exam:  Cervical Dilation (cm): 2  Cervical Effacement: 80  Fetal Station: -2  Method: sterile exam per RN (23 0531)      Fetal Monitoring:  FHR Baseline: 130  FHR Variability: moderate  FHR Accelerations: present  FHR Decelerations: absent    Contraction Frequency: q3, pit at 4mU    Exam:  General appearance: alert, appears stated age and cooperative  Head: normocephalic, without obvious abnormality, atraumatic  Abdomen: no masses, no organomegaly  Female genitalia: normal external genitalia, no erythema, no discharge  Extremities: extremities normal, warm and well-perfused; no cyanosis, clubbing, or edema        Labs:  ABO   Date Value Ref Range Status   2023 B  Final     Rh Factor   Date Value Ref Range Status   2023 Positive  Final     Rubella IgG Quant   Date Value Ref Range Status   2017 16.6 IU/ML Final     Comment:     REFERENCE RANGE:  <10 IU/mL: Non-Reactive. Considered to have absence of immunity.  10.0-14.9 IU/mL Equivocal. Follow up sample or testing with an alternate method suggested.  >= 15 IU/mL:    Reactive. Indicates acute or past infection or vaccination.         Assessment/Plan     Nicole Zawada Care is a 38 y.o. female  at 38w2d admitted for labor management PROM    FHR: Category I and Reactive  GBS: negative     Pitocin  augmentation  Epidural  Consent obtained for vaginal delivery with possibilities of transfusion, VAVD and PLTCS discussed and explained and all questions answered.      Lindsey William, DO

## 2023-12-30 NOTE — PLAN OF CARE
Problem: Adult Inpatient Plan of Care  Goal: Plan of Care Review  Outcome: Progressing  Flowsheets (Taken 2023 0931)  Progress: improving  Outcome Evaluation: doing well s/p , requests help w/breastfeeding as she exclusively pumped with her 6-year-old daughter, spouse supportive and helpful @side   Plan of Care Review  Progress: improving  Outcome Evaluation: doing well s/p , requests help w/breastfeeding as she exclusively pumped with her 6-year-old daughter, spouse supportive and helpful @side

## 2023-12-30 NOTE — ANESTHESIA PROCEDURE NOTES
Epidural Block    Patient location during procedure: OB  Start time: 12/30/2023 9:11 AM  Reason for block: labor analgesia requested by patient and obstetrician  Staffing  Performed: anesthesiologist   Anesthesiologist: Vince John MD  Performed by: Vince John MD  Authorized by: Vince John MD    Preanesthetic Checklist  Completed: patient identified, surgical consent, pre-op evaluation, timeout performed, IV checked, risks and benefits discussed, monitors and equipment checked, anesthesia consent given and sterile field maintained during procedure  Needle  Needle type: Smith   Needle gauge: 17 G  Needle length: 3.5 in  Needle insertion depth: 5 cm  Catheter type: Single orifice  Catheter size: 19 G  Catheter at skin depth: 10.5 cm  Test dose: negative and lidocaine 1.5% with epinephrine 1-to-200,000  Assessment  Sensory level: T4  Additional Notes  After the patient was prepped and draped in the standard sterile fashion, an epidural needle was inserted into the midline lumbar spine until contacting ligamentum flavum. STEPHENIE to saline was at 5 cm and the epidural catheter threaded easily and secured at 10.5 cm at the skin. After a negative aspiration to both heme and CSF, a test dose of 5ml's 1.5% Lidocaine w/ 1:200K epinephrine was given via the epidural and it was negative. A loading dose of 5ml's 0.25% Bupivacaine was then given. Patient tolerated the procedure well and is now comfortable.        Medications Administered -   lidocaine 1.5%-EPINEPHrine 1:200,000 PF (XYLOCAINE W/EPI) injection - epidural   5 mL - 12/30/2023 9:11:00 AM  bupivacaine PF (MARCAINE) injection 0.25 % - epidural   5 mL - 12/30/2023 9:11:00 AM

## 2023-12-31 LAB
ERYTHROCYTE [DISTWIDTH] IN BLOOD BY AUTOMATED COUNT: 12.8 % (ref 11.7–14.4)
HCT VFR BLDCO AUTO: 31.4 % (ref 35–45)
HGB BLD-MCNC: 10.1 G/DL (ref 11.8–15.7)
MCH RBC QN AUTO: 30.3 PG (ref 28–33.2)
MCHC RBC AUTO-ENTMCNC: 32.2 G/DL (ref 32.2–35.5)
MCV RBC AUTO: 94.3 FL (ref 83–98)
PDW BLD AUTO: 11.6 FL (ref 9.4–12.3)
PLATELET # BLD AUTO: 148 K/UL (ref 150–369)
RBC # BLD AUTO: 3.33 M/UL (ref 3.93–5.22)
WBC # BLD AUTO: 9.89 K/UL (ref 3.8–10.5)

## 2023-12-31 PROCEDURE — 36415 COLL VENOUS BLD VENIPUNCTURE: CPT | Performed by: STUDENT IN AN ORGANIZED HEALTH CARE EDUCATION/TRAINING PROGRAM

## 2023-12-31 PROCEDURE — 85027 COMPLETE CBC AUTOMATED: CPT | Performed by: STUDENT IN AN ORGANIZED HEALTH CARE EDUCATION/TRAINING PROGRAM

## 2023-12-31 PROCEDURE — 63700000 HC SELF-ADMINISTRABLE DRUG: Performed by: STUDENT IN AN ORGANIZED HEALTH CARE EDUCATION/TRAINING PROGRAM

## 2023-12-31 PROCEDURE — 12000000 HC ROOM AND CARE MED/SURG

## 2023-12-31 RX ORDER — ACETAMINOPHEN 325 MG/1
650 TABLET ORAL EVERY 4 HOURS PRN
Status: CANCELLED | OUTPATIENT
Start: 2023-12-31

## 2023-12-31 RX ORDER — SERTRALINE HYDROCHLORIDE 50 MG/1
50 TABLET, FILM COATED ORAL NIGHTLY
Qty: 30 TABLET | Refills: 0 | Status: SHIPPED | OUTPATIENT
Start: 2023-12-31 | End: 2024-01-30

## 2023-12-31 RX ORDER — IBUPROFEN 600 MG/1
600 TABLET ORAL EVERY 6 HOURS
Status: CANCELLED | OUTPATIENT
Start: 2023-12-31

## 2023-12-31 RX ORDER — AMOXICILLIN 250 MG
1 CAPSULE ORAL 2 TIMES DAILY
Status: CANCELLED | OUTPATIENT
Start: 2023-12-31

## 2023-12-31 RX ORDER — CALCIUM CARBONATE 200(500)MG
500 TABLET,CHEWABLE ORAL EVERY 4 HOURS PRN
Status: CANCELLED | OUTPATIENT
Start: 2023-12-31

## 2023-12-31 RX ORDER — DIPHENHYDRAMINE HCL 25 MG
25 CAPSULE ORAL EVERY 6 HOURS PRN
Status: CANCELLED | OUTPATIENT
Start: 2023-12-31

## 2023-12-31 RX ORDER — ALUMINUM HYDROXIDE, MAGNESIUM HYDROXIDE, AND SIMETHICONE 1200; 120; 1200 MG/30ML; MG/30ML; MG/30ML
30 SUSPENSION ORAL EVERY 4 HOURS PRN
Status: CANCELLED | OUTPATIENT
Start: 2023-12-31

## 2023-12-31 RX ORDER — ACETAMINOPHEN 325 MG/1
650 TABLET ORAL EVERY 4 HOURS PRN
Start: 2023-12-31 | End: 2024-01-30

## 2023-12-31 RX ORDER — DIPHENHYDRAMINE HCL 50 MG/ML
25 VIAL (ML) INJECTION EVERY 6 HOURS PRN
Status: CANCELLED | OUTPATIENT
Start: 2023-12-31

## 2023-12-31 RX ORDER — IBUPROFEN 600 MG/1
600 TABLET ORAL EVERY 6 HOURS
Qty: 40 TABLET | Refills: 0 | Status: SHIPPED | OUTPATIENT
Start: 2023-12-31 | End: 2024-01-10

## 2023-12-31 RX ORDER — OXYCODONE HYDROCHLORIDE 5 MG/1
5 TABLET ORAL EVERY 4 HOURS PRN
Status: CANCELLED | OUTPATIENT
Start: 2023-12-31

## 2023-12-31 RX ORDER — ONDANSETRON 4 MG/1
4 TABLET, ORALLY DISINTEGRATING ORAL EVERY 6 HOURS PRN
Status: CANCELLED | OUTPATIENT
Start: 2023-12-31

## 2023-12-31 RX ORDER — ONDANSETRON HYDROCHLORIDE 2 MG/ML
4 INJECTION, SOLUTION INTRAVENOUS EVERY 6 HOURS PRN
Status: CANCELLED | OUTPATIENT
Start: 2023-12-31

## 2023-12-31 RX ADMIN — IBUPROFEN 600 MG: 600 TABLET, FILM COATED ORAL at 08:41

## 2023-12-31 RX ADMIN — SENNOSIDES AND DOCUSATE SODIUM 1 TABLET: 50; 8.6 TABLET ORAL at 19:51

## 2023-12-31 RX ADMIN — FAMOTIDINE 20 MG: 20 TABLET ORAL at 08:42

## 2023-12-31 RX ADMIN — SENNOSIDES AND DOCUSATE SODIUM 1 TABLET: 50; 8.6 TABLET ORAL at 08:41

## 2023-12-31 NOTE — DISCHARGE SUMMARY
Inpatient Discharge Summary    BRIEF OVERVIEW  Discharge Provider: Lindsey William DO  Primary care provider: David Cortes DO    Admission Date: 2023     Discharge Date:  23    Discharge Diagnosis  Postpartum state      Discharge Disposition  Home    Discharge Medications     Medication List      START taking these medications    acetaminophen 325 mg tablet  Commonly known as: TYLENOL  Take 2 tablets (650 mg total) by mouth every 4 (four) hours as needed for pain (breakthrough pain).  Dose: 650 mg     ibuprofen 600 mg tablet  Commonly known as: MOTRIN  Take 1 tablet (600 mg total) by mouth every 6 (six) hours for 10 days.  Dose: 600 mg        CHANGE how you take these medications    * prenatal vit no.130-iron-folic 27 mg iron- 800 mcg tablet tablet  Take 1 tablet by mouth daily.  Dose: 1 tablet  What changed: Another medication with the same name was added. Make sure you understand how and when to take each.     * prenatal vit no.130-iron-folic 27 mg iron- 800 mcg tablet tablet  Take 1 tablet by mouth daily.  Dose: 1 tablet  What changed: You were already taking a medication with the same name, and this prescription was added. Make sure you understand how and when to take each.     sertraline 50 mg tablet  Commonly known as: ZOLOFT  Take 1 tablet (50 mg total) by mouth nightly.  Dose: 50 mg  What changed:   · medication strength  · how much to take  · when to take this         * This list has 2 medication(s) that are the same as other medications prescribed for you. Read the directions carefully, and ask your doctor or other care provider to review them with you.            CONTINUE taking these medications    famotidine 20 mg tablet  Commonly known as: PEPCID  Take 20 mg by mouth daily.  Dose: 20 mg            Outpatient Follow-Up  Postpartum followup in 6 weeks        DETAILS OF HOSPITAL STAY      Nicole Zawada Care is a  who presented to L&D and had Labor/Delivery: Uncomplicated  vaginal delivery. She had a routine postpartum course and was discharged in stable condition. Discharge instructions were reviewed in detail.     She will follow up in 6 weeks.     Relevant consults: none  Relevant labs: none

## 2023-12-31 NOTE — PLAN OF CARE
Plan of Care Review  Plan of Care Reviewed With: patient  Progress: no change  Outcome Evaluation: Pt doing well.  VS stable.  Pain controlled with Motrin.  Breastfeeding baby independently.   at side and supportive.  Bonding with baby.

## 2023-12-31 NOTE — LACTATION NOTE
This note was copied from a baby's chart.  P2 Today I assisted with a breastfeed.  Baby observed bf well.Showed Mom stimulation and waking techniques. Proper flange size reviewed. Bf book and Lc resources for home reviewed.

## 2023-12-31 NOTE — PLAN OF CARE
Problem: Adult Inpatient Plan of Care  Goal: Plan of Care Review  Outcome: Progressing  Flowsheets (Taken 12/31/2023 9018)  Progress: improving  Outcome Evaluation:   VSS, assessment WDL, spouse supportive/helpful @side, doing well w/self-care and baby-care   staying one more night, mostly for breastfeeding support   Plan of Care Review  Progress: improving  Outcome Evaluation: VSS, assessment WDL, spouse supportive/helpful @side, doing well w/self-care and baby-care; staying one more night, mostly for breastfeeding support

## 2024-01-01 VITALS
SYSTOLIC BLOOD PRESSURE: 133 MMHG | HEIGHT: 64 IN | BODY MASS INDEX: 29.53 KG/M2 | DIASTOLIC BLOOD PRESSURE: 87 MMHG | OXYGEN SATURATION: 96 % | RESPIRATION RATE: 16 BRPM | TEMPERATURE: 97.5 F | WEIGHT: 173 LBS | HEART RATE: 75 BPM

## 2024-01-01 PROCEDURE — 63700000 HC SELF-ADMINISTRABLE DRUG: Performed by: STUDENT IN AN ORGANIZED HEALTH CARE EDUCATION/TRAINING PROGRAM

## 2024-01-01 RX ADMIN — FAMOTIDINE 20 MG: 20 TABLET ORAL at 08:04

## 2024-01-01 RX ADMIN — SENNOSIDES AND DOCUSATE SODIUM 1 TABLET: 50; 8.6 TABLET ORAL at 08:04

## 2024-01-01 RX ADMIN — PRENATAL VITAMINS-IRON FUMARATE 27 MG IRON-FOLIC ACID 0.8 MG TABLET 1 TABLET: at 08:04

## 2024-01-01 NOTE — PLAN OF CARE
Problem: Adult Inpatient Plan of Care  Goal: Plan of Care Review  Outcome: Progressing  Flowsheets (Taken 1/1/2024 0140)  Progress: improving  Outcome Evaluation: VSS. Breast feeding independently. Spouse supportive at bedside. Bonding well with baby.  Plan of Care Reviewed With:   patient   spouse   Plan of Care Review  Plan of Care Reviewed With: patient, spouse  Progress: improving  Outcome Evaluation: VSS. Breast feeding independently. Spouse supportive at bedside. Bonding well with baby.

## 2024-01-01 NOTE — DISCHARGE SUMMARY
Inpatient Discharge Summary    BRIEF OVERVIEW  Admitting Provider: Lindsey William DO  Discharge Provider: Lindsey William DO  Primary Care Physician at Discharge: David Cortes -[phone]     Admission Date: 12/30/2023     Discharge Date: 1/1/2024    Primary Discharge Diagnosis  Postpartum state    Secondary Discharge Diagnosis    Discharge Disposition  Home     Discharge Medications     Medication List      START taking these medications    acetaminophen 325 mg tablet  Commonly known as: TYLENOL  Take 2 tablets (650 mg total) by mouth every 4 (four) hours as needed for pain (breakthrough pain).  Dose: 650 mg     ibuprofen 600 mg tablet  Commonly known as: MOTRIN  Take 1 tablet (600 mg total) by mouth every 6 (six) hours for 10 days.  Dose: 600 mg        CHANGE how you take these medications    * prenatal vit no.130-iron-folic 27 mg iron- 800 mcg tablet tablet  Take 1 tablet by mouth daily.  Dose: 1 tablet  What changed: Another medication with the same name was added. Make sure you understand how and when to take each.     * prenatal vit no.130-iron-folic 27 mg iron- 800 mcg tablet tablet  Take 1 tablet by mouth daily.  Dose: 1 tablet  What changed: You were already taking a medication with the same name, and this prescription was added. Make sure you understand how and when to take each.     sertraline 50 mg tablet  Commonly known as: ZOLOFT  Take 1 tablet (50 mg total) by mouth nightly.  Dose: 50 mg  What changed:   · medication strength  · how much to take  · when to take this         * This list has 2 medication(s) that are the same as other medications prescribed for you. Read the directions carefully, and ask your doctor or other care provider to review them with you.            CONTINUE taking these medications    famotidine 20 mg tablet  Commonly known as: PEPCID  Take 20 mg by mouth daily.  Dose: 20 mg            Active Issues Requiring Follow-up  Issue: none  Responsible Individual:  self  What is Needed: 6 weeks or sooner if need  Follow-up Appointments Arranged: No     Outpatient Follow-Up            In 7 months David Cortes DO [facility] in [place]          Test Results Pending at Discharge  Unresulted Labs (From admission, onward)     Start     Ordered    Signed and Held  CBC (Complete Blood Count) Postpartum Day #1 in the AM  Morning draw        Question:  Release to patient  Answer:  Immediate    Signed and Held                DETAILS OF HOSPITAL STAY    Presenting Problem/History of Present Illness  Term pregnancy [Z34.90]      Hospital Course/Operative Procedures Performed    Consults: none  Procedures:   Pertinent Test Results:  CBC Results       23     0600 0646 0737    WBC 9.89 7.69 4.2    RBC 3.33 3.82 4.25    HGB 10.1 11.6 13.2    HCT 31.4 35.5 39.2    MCV 94.3 92.9 92    MCH 30.3 30.4 31.1    MCHC 32.2 32.7 33.7     183 311

## 2024-01-01 NOTE — NURSING NOTE
Pt discharged home with infant and  at this time as per MD order. Discharge instructions given to pt by RN. Pt verbalizes understanding of discharge instructions. Pt instructed to follow up with OB in 6 weeks. Pt verbalizes understanding.

## 2024-02-27 ENCOUNTER — TRANSCRIBE ORDERS (OUTPATIENT)
Dept: SCHEDULING | Age: 39
End: 2024-02-27

## 2024-02-27 DIAGNOSIS — N63.32 UNSPECIFIED LUMP IN AXILLARY TAIL OF THE LEFT BREAST: Primary | ICD-10-CM

## 2024-03-13 ENCOUNTER — HOSPITAL ENCOUNTER (OUTPATIENT)
Dept: RADIOLOGY | Facility: HOSPITAL | Age: 39
Discharge: HOME | End: 2024-03-13
Attending: STUDENT IN AN ORGANIZED HEALTH CARE EDUCATION/TRAINING PROGRAM
Payer: COMMERCIAL

## 2024-03-13 DIAGNOSIS — N63.32 UNSPECIFIED LUMP IN AXILLARY TAIL OF THE LEFT BREAST: ICD-10-CM

## 2024-03-13 PROCEDURE — 76882 US LMTD JT/FCL EVL NVASC XTR: CPT | Mod: LT

## 2024-03-13 PROCEDURE — G0279 TOMOSYNTHESIS, MAMMO: HCPCS

## 2024-03-13 PROCEDURE — 77066 DX MAMMO INCL CAD BI: CPT
